# Patient Record
Sex: FEMALE | Race: WHITE | Employment: OTHER | URBAN - METROPOLITAN AREA
[De-identification: names, ages, dates, MRNs, and addresses within clinical notes are randomized per-mention and may not be internally consistent; named-entity substitution may affect disease eponyms.]

---

## 2020-09-03 ENCOUNTER — HOSPITAL ENCOUNTER (EMERGENCY)
Facility: CLINIC | Age: 45
Discharge: HOME | End: 2020-09-03
Attending: EMERGENCY MEDICINE
Payer: MEDICARE

## 2020-09-03 VITALS
DIASTOLIC BLOOD PRESSURE: 106 MMHG | BODY MASS INDEX: 48.82 KG/M2 | HEIGHT: 65 IN | TEMPERATURE: 98 F | RESPIRATION RATE: 16 BRPM | WEIGHT: 293 LBS | HEART RATE: 81 BPM | OXYGEN SATURATION: 98 % | SYSTOLIC BLOOD PRESSURE: 166 MMHG

## 2020-09-03 PROCEDURE — 6370000000 HC RX 637 (ALT 250 FOR IP): Performed by: EMERGENCY MEDICINE

## 2020-09-03 PROCEDURE — 99282 EMERGENCY DEPT VISIT SF MDM: CPT

## 2020-09-03 RX ORDER — GINSENG 100 MG
CAPSULE ORAL ONCE
Status: COMPLETED | OUTPATIENT
Start: 2020-09-03 | End: 2020-09-03

## 2020-09-03 RX ADMIN — BACITRACIN: 500 OINTMENT TOPICAL at 19:04

## 2020-09-03 ASSESSMENT — PAIN DESCRIPTION - ORIENTATION: ORIENTATION: LEFT;MID;INNER

## 2020-09-03 ASSESSMENT — PAIN DESCRIPTION - PAIN TYPE: TYPE: ACUTE PAIN

## 2020-09-03 ASSESSMENT — PAIN SCALES - GENERAL: PAINLEVEL_OUTOF10: 6

## 2020-09-03 ASSESSMENT — PAIN DESCRIPTION - PROGRESSION: CLINICAL_PROGRESSION: NOT CHANGED

## 2020-09-03 ASSESSMENT — PAIN DESCRIPTION - ONSET: ONSET: ON-GOING

## 2020-09-03 ASSESSMENT — PAIN DESCRIPTION - DESCRIPTORS: DESCRIPTORS: SORE;TENDER

## 2020-09-03 ASSESSMENT — PAIN DESCRIPTION - LOCATION: LOCATION: LEG

## 2020-09-03 ASSESSMENT — PAIN DESCRIPTION - FREQUENCY: FREQUENCY: CONTINUOUS

## 2020-09-03 NOTE — ED PROVIDER NOTES
[address] [facility]  eMERGENCY dEPARTMENT eNCOUnter      Pt Name: Sherman Darling  MRN: [id number]  [place] 1975  Date of evaluation: 9/3/2020      CHIEF COMPLAINT       Chief Complaint   Patient presents with    Wound Check     Stitches removed from dog bite on left leg. Pt states it looks like infection is getting better. HISTORY OF PRESENT ILLNESS      The patient presents with a wound to the left thigh. She was bit by a dog previously. She was seen on August 23 at another facility where they sutured the wound and wrote her for Keflex. Yesterday she went to a [facility] facility and had the sutures removed. The wound had dehisced when they removed the sutures. They noticed some drainage. She was put on Augmentin. The patient states that the surrounding redness has actually gotten better. She has not had any drainage today. She wasn't sure how to care for the wound. She was thinking maybe if she put applied skin tissue adhesive from the store it would bring the wound edges back together. She wasn't sure how to manage it. She denies fever. She is not having any worsening pain. Nothing makes her symptoms better or worse otherwise. She does not have a primary care physician at this time. REVIEW OF SYSTEMS       All systems reviewed and negative unless noted in HPI. The patient denies fever or constitutional symptoms. Denies chest pain or shortness of breath. Denies recent musculoskeletal injury. Denies any weakness, numbness or focal neurologic deficit. Left thigh wound as noted in HPI. No recent psychiatric issues. No easy bruising or bleeding. Denies any polyuria, polydypsia or history of immunocompromise. PAST MEDICAL HISTORY    has a past medical history of PID (acute pelvic inflammatory disease). SURGICAL HISTORY      has a past surgical history that includes Cervical spine surgery and cervical fusion.     CURRENT MEDICATIONS Previous Medications    No medications on file       ALLERGIES     has No Known Allergies. FAMILY HISTORY     has no family status information on file. family history is not on file. SOCIAL HISTORY      reports that she has never smoked. She does not have any smokeless tobacco history on file. She reports current alcohol use. She reports current drug use. PHYSICAL EXAM     INITIAL VITALS:  height is 5' 5\" (1.651 m) and weight is 138.3 kg (305 lb) (abnormal). Her oral temperature is 98 °F (36.7 °C). Her blood pressure is 166/106 (abnormal) and her pulse is 81. Her respiration is 16 and oxygen saturation is 98%. Patient is alert and oriented, in no apparent distress. HEENT is atraumatic. Pupils are PERRL at 5 mm. Mucous membranes moist.    Neck is supple with no lymphadenopathy. No JVD. No meningismus. Heart sounds regular rate and rhythm with no gallops, murmurs, or rubs. Lungs clear, no wheezes, rales or rhonchi. Musculoskeletal exam:  Examination of left lower extremity demonstrates a 15 x 12 cm area of redness in the medial, distal thigh. Within this is a one centimeter scabbed area that appears to be healing well. A wound that was previously sutured is also noted. It measures approximately 5 cm in length. There is a small amount of dehiscence. The wound is not particularly deep and is only about 0.5 cm in its total depth and 0.4 cm across. There is no evidence of abscess or drainage. I note no fluctuance to the reddened area. There is no red streaking. The patient has normal sensorimotor functioning and no evidence of compartment syndrome. Normal distal pulses in all extremities. Skin: no lymphangitis. Wound exam as above. Neurological exam reveals cranial nerves 2 through 12 grossly intact. Patient has equal  and normal deep tendon reflexes. Psychiatric: no hallucinations or suicidal ideation. Lymphatics.:  No lymphadenopathy.        DIFFERENTIAL DIAGNOSIS/ MDM:     Wound check, dehiscence, abscess    DIAGNOSTIC RESULTS         EMERGENCY DEPARTMENT COURSE:   Vitals:    Vitals:    09/03/20 1853   BP: (!) 166/106   Pulse: 81   Resp: 16   Temp: 98 °F (36.7 °C)   TempSrc: Oral   SpO2: 98%   Weight: (!) 138.3 kg (305 lb)   Height: 5' 5\" (1.651 m)     -------------------------  BP: (!) 166/106, Temp: 98 °F (36.7 °C), Pulse: 81, Resp: 16      Re-evaluation Notes    The patient's wound was cleansed and dressed in the emergency department. I explained that at this point she cannot bring the wound edges back together with adhesives or even sutures. The wound will have to heal in by secondary intention. The patient indicates that the wound infection is improving already. She should continue the Augmentin. We've applied a dressing here for her and she may continue local dressing changes and wound care at home. The patient is discharged in good condition. FINAL IMPRESSION      1.  Visit for wound check          DISPOSITION/PLAN   DISPOSITION Decision To Discharge 09/03/2020 06:59:01 PM      Condition on Disposition    good    PATIENT REFERRED TO:  your doctor    In 2 days  For wound re-check      DISCHARGE MEDICATIONS:  New Prescriptions    No medications on file       (Please note that portions of this note were completed with a voice recognition program.  Efforts were made to edit the dictations but occasionally words are mis-transcribed.)    Castillo MD   Attending Emergency Physician       Radha Morillo MD  09/03/20 1064

## 2021-02-18 ENCOUNTER — HOSPITAL ENCOUNTER (INPATIENT)
Age: 46
LOS: 8 days | Discharge: HOME HEALTH | DRG: 710 | End: 2021-02-26
Attending: EMERGENCY MEDICINE | Admitting: FAMILY MEDICINE
Payer: MEDICARE

## 2021-02-18 DIAGNOSIS — L02.219 CELLULITIS AND ABSCESS OF TRUNK: Primary | ICD-10-CM

## 2021-02-18 DIAGNOSIS — L03.319 CELLULITIS AND ABSCESS OF TRUNK: Primary | ICD-10-CM

## 2021-02-18 PROBLEM — L03.90 CELLULITIS: Status: ACTIVE | Noted: 2021-02-18

## 2021-02-18 LAB
ABSOLUTE EOS #: 0 K/UL (ref 0–0.4)
ABSOLUTE IMMATURE GRANULOCYTE: ABNORMAL K/UL (ref 0–0.3)
ABSOLUTE LYMPH #: 1.4 K/UL (ref 1–4.8)
ABSOLUTE MONO #: 0.6 K/UL (ref 0.1–1.2)
ANION GAP SERPL CALCULATED.3IONS-SCNC: 10 MMOL/L (ref 9–17)
BASOPHILS # BLD: 1 % (ref 0–2)
BASOPHILS ABSOLUTE: 0.1 K/UL (ref 0–0.2)
BUN BLDV-MCNC: 12 MG/DL (ref 6–20)
BUN/CREAT BLD: ABNORMAL (ref 9–20)
CALCIUM SERPL-MCNC: 8.8 MG/DL (ref 8.6–10.4)
CHLORIDE BLD-SCNC: 100 MMOL/L (ref 98–107)
CO2: 27 MMOL/L (ref 20–31)
CREAT SERPL-MCNC: 0.7 MG/DL (ref 0.5–0.9)
DIFFERENTIAL TYPE: ABNORMAL
EOSINOPHILS RELATIVE PERCENT: 0 % (ref 1–4)
GFR AFRICAN AMERICAN: >60 ML/MIN
GFR NON-AFRICAN AMERICAN: >60 ML/MIN
GFR SERPL CREATININE-BSD FRML MDRD: ABNORMAL ML/MIN/{1.73_M2}
GFR SERPL CREATININE-BSD FRML MDRD: ABNORMAL ML/MIN/{1.73_M2}
GLUCOSE BLD-MCNC: 122 MG/DL (ref 70–99)
HCT VFR BLD CALC: 43.2 % (ref 36–46)
HEMOGLOBIN: 14.4 G/DL (ref 12–16)
IMMATURE GRANULOCYTES: ABNORMAL %
LACTIC ACID, SEPSIS WHOLE BLOOD: NORMAL MMOL/L (ref 0.5–1.9)
LACTIC ACID, SEPSIS: 1 MMOL/L (ref 0.5–1.9)
LYMPHOCYTES # BLD: 11 % (ref 24–44)
MCH RBC QN AUTO: 30.7 PG (ref 26–34)
MCHC RBC AUTO-ENTMCNC: 33.4 G/DL (ref 31–37)
MCV RBC AUTO: 91.7 FL (ref 80–100)
MONOCYTES # BLD: 5 % (ref 2–11)
NRBC AUTOMATED: ABNORMAL PER 100 WBC
PDW BLD-RTO: 13 % (ref 12.5–15.4)
PLATELET # BLD: 247 K/UL (ref 140–450)
PLATELET ESTIMATE: ABNORMAL
PMV BLD AUTO: 8.6 FL (ref 6–12)
POTASSIUM SERPL-SCNC: 3.9 MMOL/L (ref 3.7–5.3)
RBC # BLD: 4.71 M/UL (ref 4–5.2)
RBC # BLD: ABNORMAL 10*6/UL
SEG NEUTROPHILS: 83 % (ref 36–66)
SEGMENTED NEUTROPHILS ABSOLUTE COUNT: 10.4 K/UL (ref 1.8–7.7)
SODIUM BLD-SCNC: 137 MMOL/L (ref 135–144)
WBC # BLD: 12.5 K/UL (ref 3.5–11)
WBC # BLD: ABNORMAL 10*3/UL

## 2021-02-18 PROCEDURE — 85025 COMPLETE CBC W/AUTO DIFF WBC: CPT

## 2021-02-18 PROCEDURE — 80048 BASIC METABOLIC PNL TOTAL CA: CPT

## 2021-02-18 PROCEDURE — 6360000002 HC RX W HCPCS: Performed by: EMERGENCY MEDICINE

## 2021-02-18 PROCEDURE — 83605 ASSAY OF LACTIC ACID: CPT

## 2021-02-18 PROCEDURE — 1200000000 HC SEMI PRIVATE

## 2021-02-18 PROCEDURE — 96375 TX/PRO/DX INJ NEW DRUG ADDON: CPT

## 2021-02-18 PROCEDURE — 96365 THER/PROPH/DIAG IV INF INIT: CPT

## 2021-02-18 PROCEDURE — 6370000000 HC RX 637 (ALT 250 FOR IP): Performed by: EMERGENCY MEDICINE

## 2021-02-18 PROCEDURE — 99285 EMERGENCY DEPT VISIT HI MDM: CPT

## 2021-02-18 PROCEDURE — 36415 COLL VENOUS BLD VENIPUNCTURE: CPT

## 2021-02-18 PROCEDURE — 2580000003 HC RX 258: Performed by: EMERGENCY MEDICINE

## 2021-02-18 PROCEDURE — 87040 BLOOD CULTURE FOR BACTERIA: CPT

## 2021-02-18 RX ORDER — ACETAMINOPHEN 325 MG/1
650 TABLET ORAL ONCE
Status: COMPLETED | OUTPATIENT
Start: 2021-02-18 | End: 2021-02-18

## 2021-02-18 RX ORDER — ONDANSETRON 2 MG/ML
4 INJECTION INTRAMUSCULAR; INTRAVENOUS ONCE
Status: COMPLETED | OUTPATIENT
Start: 2021-02-18 | End: 2021-02-18

## 2021-02-18 RX ORDER — KETOROLAC TROMETHAMINE 30 MG/ML
30 INJECTION, SOLUTION INTRAMUSCULAR; INTRAVENOUS ONCE
Status: COMPLETED | OUTPATIENT
Start: 2021-02-18 | End: 2021-02-18

## 2021-02-18 RX ADMIN — KETOROLAC TROMETHAMINE 30 MG: 30 INJECTION, SOLUTION INTRAMUSCULAR at 20:23

## 2021-02-18 RX ADMIN — ONDANSETRON 4 MG: 2 INJECTION INTRAMUSCULAR; INTRAVENOUS at 20:23

## 2021-02-18 RX ADMIN — CEFAZOLIN SODIUM 1000 MG: 1 INJECTION, POWDER, FOR SOLUTION INTRAMUSCULAR; INTRAVENOUS at 20:23

## 2021-02-18 RX ADMIN — ACETAMINOPHEN 650 MG: 325 TABLET ORAL at 20:23

## 2021-02-18 ASSESSMENT — ENCOUNTER SYMPTOMS
COLOR CHANGE: 1
ALLERGIC/IMMUNOLOGIC NEGATIVE: 1
RESPIRATORY NEGATIVE: 1
EYES NEGATIVE: 1
GASTROINTESTINAL NEGATIVE: 1

## 2021-02-18 ASSESSMENT — PAIN SCALES - GENERAL
PAINLEVEL_OUTOF10: 8
PAINLEVEL_OUTOF10: 8

## 2021-02-18 ASSESSMENT — PAIN DESCRIPTION - LOCATION: LOCATION: ABDOMEN

## 2021-02-18 ASSESSMENT — PAIN DESCRIPTION - ORIENTATION: ORIENTATION: LOWER

## 2021-02-18 ASSESSMENT — PAIN DESCRIPTION - PAIN TYPE: TYPE: ACUTE PAIN

## 2021-02-18 ASSESSMENT — PAIN DESCRIPTION - DESCRIPTORS: DESCRIPTORS: TENDER

## 2021-02-19 ENCOUNTER — APPOINTMENT (OUTPATIENT)
Dept: CT IMAGING | Age: 46
DRG: 710 | End: 2021-02-19
Payer: MEDICARE

## 2021-02-19 LAB
ABSOLUTE EOS #: 0.04 K/UL (ref 0–0.44)
ABSOLUTE IMMATURE GRANULOCYTE: 0.07 K/UL (ref 0–0.3)
ABSOLUTE LYMPH #: 2.14 K/UL (ref 1.1–3.7)
ABSOLUTE MONO #: 0.86 K/UL (ref 0.1–1.2)
ANION GAP SERPL CALCULATED.3IONS-SCNC: 10 MMOL/L (ref 9–17)
BASOPHILS # BLD: 0 % (ref 0–2)
BASOPHILS ABSOLUTE: 0.05 K/UL (ref 0–0.2)
BUN BLDV-MCNC: 17 MG/DL (ref 6–20)
BUN/CREAT BLD: 24 (ref 9–20)
CALCIUM SERPL-MCNC: 8.1 MG/DL (ref 8.6–10.4)
CHLORIDE BLD-SCNC: 102 MMOL/L (ref 98–107)
CO2: 24 MMOL/L (ref 20–31)
CREAT SERPL-MCNC: 0.72 MG/DL (ref 0.5–0.9)
DIFFERENTIAL TYPE: ABNORMAL
EOSINOPHILS RELATIVE PERCENT: 0 % (ref 1–4)
GFR AFRICAN AMERICAN: >60 ML/MIN
GFR NON-AFRICAN AMERICAN: >60 ML/MIN
GFR SERPL CREATININE-BSD FRML MDRD: ABNORMAL ML/MIN/{1.73_M2}
GFR SERPL CREATININE-BSD FRML MDRD: ABNORMAL ML/MIN/{1.73_M2}
GLUCOSE BLD-MCNC: 127 MG/DL (ref 70–99)
HCT VFR BLD CALC: 40.5 % (ref 36.3–47.1)
HEMOGLOBIN: 12.9 G/DL (ref 11.9–15.1)
IMMATURE GRANULOCYTES: 1 %
LYMPHOCYTES # BLD: 16 % (ref 24–43)
MCH RBC QN AUTO: 30.1 PG (ref 25.2–33.5)
MCHC RBC AUTO-ENTMCNC: 31.9 G/DL (ref 28.4–34.8)
MCV RBC AUTO: 94.4 FL (ref 82.6–102.9)
MONOCYTES # BLD: 6 % (ref 3–12)
NRBC AUTOMATED: 0 PER 100 WBC
PDW BLD-RTO: 12.7 % (ref 11.8–14.4)
PLATELET # BLD: 239 K/UL (ref 138–453)
PLATELET ESTIMATE: ABNORMAL
PMV BLD AUTO: 10.9 FL (ref 8.1–13.5)
POTASSIUM SERPL-SCNC: 3.9 MMOL/L (ref 3.7–5.3)
RBC # BLD: 4.29 M/UL (ref 3.95–5.11)
RBC # BLD: ABNORMAL 10*6/UL
SEG NEUTROPHILS: 77 % (ref 36–65)
SEGMENTED NEUTROPHILS ABSOLUTE COUNT: 10.49 K/UL (ref 1.5–8.1)
SODIUM BLD-SCNC: 136 MMOL/L (ref 135–144)
WBC # BLD: 13.7 K/UL (ref 3.5–11.3)
WBC # BLD: ABNORMAL 10*3/UL

## 2021-02-19 PROCEDURE — 6360000002 HC RX W HCPCS: Performed by: FAMILY MEDICINE

## 2021-02-19 PROCEDURE — 1200000000 HC SEMI PRIVATE

## 2021-02-19 PROCEDURE — 2580000003 HC RX 258: Performed by: SURGERY

## 2021-02-19 PROCEDURE — 6370000000 HC RX 637 (ALT 250 FOR IP): Performed by: FAMILY MEDICINE

## 2021-02-19 PROCEDURE — 85025 COMPLETE CBC W/AUTO DIFF WBC: CPT

## 2021-02-19 PROCEDURE — 36415 COLL VENOUS BLD VENIPUNCTURE: CPT

## 2021-02-19 PROCEDURE — 2580000003 HC RX 258: Performed by: FAMILY MEDICINE

## 2021-02-19 PROCEDURE — 80048 BASIC METABOLIC PNL TOTAL CA: CPT

## 2021-02-19 PROCEDURE — 6360000004 HC RX CONTRAST MEDICATION: Performed by: SURGERY

## 2021-02-19 PROCEDURE — 74177 CT ABD & PELVIS W/CONTRAST: CPT

## 2021-02-19 RX ORDER — 0.9 % SODIUM CHLORIDE 0.9 %
80 INTRAVENOUS SOLUTION INTRAVENOUS ONCE
Status: COMPLETED | OUTPATIENT
Start: 2021-02-19 | End: 2021-02-19

## 2021-02-19 RX ORDER — HYDROCODONE BITARTRATE AND ACETAMINOPHEN 5; 325 MG/1; MG/1
1 TABLET ORAL EVERY 4 HOURS PRN
Status: DISCONTINUED | OUTPATIENT
Start: 2021-02-19 | End: 2021-02-22

## 2021-02-19 RX ORDER — IBUPROFEN 200 MG
800 TABLET ORAL PRN
Status: ON HOLD | COMMUNITY
End: 2021-02-19

## 2021-02-19 RX ORDER — SODIUM CHLORIDE 0.9 % (FLUSH) 0.9 %
10 SYRINGE (ML) INJECTION PRN
Status: DISCONTINUED | OUTPATIENT
Start: 2021-02-19 | End: 2021-02-26 | Stop reason: HOSPADM

## 2021-02-19 RX ORDER — NICOTINE 21 MG/24HR
1 PATCH, TRANSDERMAL 24 HOURS TRANSDERMAL DAILY
Status: DISCONTINUED | OUTPATIENT
Start: 2021-02-19 | End: 2021-02-26 | Stop reason: HOSPADM

## 2021-02-19 RX ORDER — MORPHINE SULFATE 2 MG/ML
2 INJECTION, SOLUTION INTRAMUSCULAR; INTRAVENOUS EVERY 4 HOURS PRN
Status: DISCONTINUED | OUTPATIENT
Start: 2021-02-19 | End: 2021-02-23

## 2021-02-19 RX ORDER — SODIUM CHLORIDE 9 MG/ML
INJECTION, SOLUTION INTRAVENOUS CONTINUOUS
Status: DISCONTINUED | OUTPATIENT
Start: 2021-02-19 | End: 2021-02-21

## 2021-02-19 RX ORDER — METOPROLOL TARTRATE 5 MG/5ML
5 INJECTION INTRAVENOUS EVERY 6 HOURS PRN
Status: DISCONTINUED | OUTPATIENT
Start: 2021-02-19 | End: 2021-02-26 | Stop reason: HOSPADM

## 2021-02-19 RX ORDER — ACETAMINOPHEN 325 MG/1
650 TABLET ORAL EVERY 4 HOURS PRN
Status: DISCONTINUED | OUTPATIENT
Start: 2021-02-19 | End: 2021-02-26 | Stop reason: HOSPADM

## 2021-02-19 RX ADMIN — HYDROCODONE BITARTRATE AND ACETAMINOPHEN 1 TABLET: 5; 325 TABLET ORAL at 03:17

## 2021-02-19 RX ADMIN — HYDROCODONE BITARTRATE AND ACETAMINOPHEN 1 TABLET: 5; 325 TABLET ORAL at 18:59

## 2021-02-19 RX ADMIN — CEFAZOLIN SODIUM 1000 MG: 1 INJECTION, POWDER, FOR SOLUTION INTRAMUSCULAR; INTRAVENOUS at 03:17

## 2021-02-19 RX ADMIN — SODIUM CHLORIDE, PRESERVATIVE FREE 10 ML: 5 INJECTION INTRAVENOUS at 20:20

## 2021-02-19 RX ADMIN — ACETAMINOPHEN 650 MG: 325 TABLET ORAL at 17:23

## 2021-02-19 RX ADMIN — ENOXAPARIN SODIUM 40 MG: 100 INJECTION SUBCUTANEOUS at 21:08

## 2021-02-19 RX ADMIN — SODIUM CHLORIDE: 9 INJECTION, SOLUTION INTRAVENOUS at 03:05

## 2021-02-19 RX ADMIN — HYDROCODONE BITARTRATE AND ACETAMINOPHEN 1 TABLET: 5; 325 TABLET ORAL at 23:39

## 2021-02-19 RX ADMIN — ACETAMINOPHEN 650 MG: 325 TABLET ORAL at 04:30

## 2021-02-19 RX ADMIN — IOHEXOL 30 ML: 300 INJECTION, SOLUTION INTRAVENOUS at 20:19

## 2021-02-19 RX ADMIN — VANCOMYCIN HYDROCHLORIDE 2500 MG: 5 INJECTION, POWDER, LYOPHILIZED, FOR SOLUTION INTRAVENOUS at 17:16

## 2021-02-19 RX ADMIN — CEFAZOLIN SODIUM 1000 MG: 1 INJECTION, POWDER, FOR SOLUTION INTRAMUSCULAR; INTRAVENOUS at 12:18

## 2021-02-19 RX ADMIN — IOPAMIDOL 75 ML: 755 INJECTION, SOLUTION INTRAVENOUS at 20:19

## 2021-02-19 RX ADMIN — MORPHINE SULFATE 2 MG: 2 INJECTION, SOLUTION INTRAMUSCULAR; INTRAVENOUS at 15:06

## 2021-02-19 RX ADMIN — ENOXAPARIN SODIUM 30 MG: 30 INJECTION SUBCUTANEOUS at 08:12

## 2021-02-19 RX ADMIN — HYDROCODONE BITARTRATE AND ACETAMINOPHEN 1 TABLET: 5; 325 TABLET ORAL at 08:22

## 2021-02-19 RX ADMIN — SODIUM CHLORIDE, PRESERVATIVE FREE 10 ML: 5 INJECTION INTRAVENOUS at 20:21

## 2021-02-19 RX ADMIN — MORPHINE SULFATE 2 MG: 2 INJECTION, SOLUTION INTRAMUSCULAR; INTRAVENOUS at 04:34

## 2021-02-19 RX ADMIN — HYDROCODONE BITARTRATE AND ACETAMINOPHEN 1 TABLET: 5; 325 TABLET ORAL at 12:18

## 2021-02-19 RX ADMIN — MORPHINE SULFATE 2 MG: 2 INJECTION, SOLUTION INTRAMUSCULAR; INTRAVENOUS at 19:51

## 2021-02-19 RX ADMIN — SODIUM CHLORIDE 80 ML: 0.9 INJECTION, SOLUTION INTRAVENOUS at 20:22

## 2021-02-19 SDOH — HEALTH STABILITY: MENTAL HEALTH: HOW OFTEN DO YOU HAVE A DRINK CONTAINING ALCOHOL?: MONTHLY OR LESS

## 2021-02-19 ASSESSMENT — PAIN SCALES - GENERAL
PAINLEVEL_OUTOF10: 6
PAINLEVEL_OUTOF10: 6
PAINLEVEL_OUTOF10: 3
PAINLEVEL_OUTOF10: 7
PAINLEVEL_OUTOF10: 2
PAINLEVEL_OUTOF10: 7

## 2021-02-19 ASSESSMENT — PAIN - FUNCTIONAL ASSESSMENT
PAIN_FUNCTIONAL_ASSESSMENT: ACTIVITIES ARE NOT PREVENTED

## 2021-02-19 ASSESSMENT — PAIN DESCRIPTION - FREQUENCY
FREQUENCY: INTERMITTENT
FREQUENCY: CONTINUOUS

## 2021-02-19 ASSESSMENT — PAIN DESCRIPTION - ORIENTATION
ORIENTATION: LOWER

## 2021-02-19 ASSESSMENT — PAIN DESCRIPTION - PROGRESSION
CLINICAL_PROGRESSION: GRADUALLY WORSENING

## 2021-02-19 ASSESSMENT — PAIN DESCRIPTION - PAIN TYPE
TYPE: ACUTE PAIN

## 2021-02-19 ASSESSMENT — PAIN DESCRIPTION - DESCRIPTORS
DESCRIPTORS: ACHING;BURNING;DISCOMFORT
DESCRIPTORS: ACHING;THROBBING
DESCRIPTORS: ACHING;DISCOMFORT
DESCRIPTORS: ACHING;DULL

## 2021-02-19 ASSESSMENT — PAIN DESCRIPTION - LOCATION
LOCATION: ABDOMEN
LOCATION: ABDOMEN;HEAD
LOCATION: HEAD
LOCATION: ABDOMEN;HEAD
LOCATION: ABDOMEN

## 2021-02-19 ASSESSMENT — PAIN DESCRIPTION - ONSET
ONSET: GRADUAL
ONSET: GRADUAL

## 2021-02-19 NOTE — FLOWSHEET NOTE
Writer visits patient per consult for Advance Directive information and rounding. Patient is reclining in a darkened room with the door closed. Writer knocks and patient responds. Patient is approachable but drowsy. Patient accepts Advance Directive documents and writer explains the forms. Patient will review the documents later. Patient denies any other concerns. Patient expresses appreciation for the visit. Spiritual Care will follow as needed. 02/19/21 0930   Encounter Summary   Services provided to: Patient   Referral/Consult From: Nurse;Rounding   Support System Family members   Continue Visiting   (2/19/21)   Complexity of Encounter Moderate   Length of Encounter 15 minutes   Routine   Type Initial   Assessment Approachable   Intervention Active listening;Explored coping resources;Nurtured hope   Outcome Expressed gratitude   Advance Directives (For Healthcare)   Healthcare Directive No, patient does not have an advance directive for healthcare treatment   Information on Healthcare Directives Requested Yes   Patient Requests Assistance No   Advance Directives Unable to complete; Documents given;Documents explained

## 2021-02-19 NOTE — CARE COORDINATION
Case Management Initial Discharge Plan  [facility],         Readmission Risk              Risk of Unplanned Readmission:        7             Met with:patient to discuss discharge plans. Information verified: address, contacts, phone number, , insurance Yes  PCP: No longer has one has not pcp in 2 years has appt with new pcp in  Date of last visit:     Insurance Provider: [employer] [employer]    Discharge Planning  Current Residence:  3 story home   Living Arrangements:  Children, Family Members   Home has =3 stories/1 stairs to climb into home  Support Systems:  Children, Family Members  Current Services PTA:  na Agency: na  Patient able to perform ADL's:Independent  DME in home:  NA  DME used to aid ambulation prior to admission:   NA  DME used during admission:  NA    Potential Assistance Needed:  N/A    Pharmacy: [email or web address] Medications:  No  Does patient want to participate in local refill/ meds to beds program?       Patient agreeable to home care: No  [place] of choice provided:  n/a      Type of Home Care Services:  None  Patient expects to be discharged to:  home    Prior SNF/Rehab Placement and Facility: no  Agreeable to SNF/Rehab: No  [place] of choice provided: n/a   Evaluation: n/a    Expected Discharge date:  21  Follow Up Appointment: Best Day/ Time:      Transportation provider: valentino  Transportation arrangements needed for discharge: No    Discharge Plan: Pt is from home, independent, drives. No DME. No needs.      Ancef IV  IVF @100        Electronically signed by Daysi Gamboa RN on 21 at 1:02 PM EST

## 2021-02-19 NOTE — PROGRESS NOTES
Patient weight is 150 kg or greater. For prophylaxis with Enoxaparin, Pharmacy adjusted the dose to account for the patient's increased body weight in accordance with hospital approved protocol. The dose has been changed to 40 mg BID. Please contact pharmacy with any concerns @ [phone]. Thank you.    Kay Sharma  2/19/2021 2:38 PM

## 2021-02-19 NOTE — ED NOTES
Mode of arrival (squad #, walk in, police, etc) : walk in from home        Chief complaint(s): abscess on lower abdomen        Arrival Note (brief scenario, treatment PTA, etc). : Pt presented to the ED c/o abscess on lower abdomen. Pt states she noticed the abscess yesterday, with a little redness around the area. Pt states the area is tender to the touch. Pt states today, she noticed the pain is getting worse, and the redness has spread. Pt states she has been feeling chilled all day. Pt denies drainage from area. Pt alert and oriented. Cellulitis is noted at the lower abdomen that is warm and tender to the touch. C= \"Have you ever felt that you should Cut down on your drinking? \"  No  A= \"Have people Annoyed you by criticizing your drinking? \"  No  G= \"Have you ever felt bad or Guilty about your drinking? \"  No  E= \"Have you ever had a drink as an Eye-opener first thing in the morning to steady your nerves or to help a hangover? \"  No      Deferred []      Reason for deferring: N/A    *If yes to two or more: probable alcohol abuse. Andra Soulier, RN  02/18/21 2008

## 2021-02-19 NOTE — PROGRESS NOTES
Pt admitted to room 1012 direct admit from [place] ED. Oriented to room and surroundings  Bed in lowest position, wheels locked, 2/4 side rails up  Call light in reach, room free of clutter, adequate lighting provided  Denies any further questions at this time  Instructed to call out with any questions/concerns/new onset of pain and/or n/v   White board updated  Continue to monitor with hourly rounding  Non-skid socks on/at bedside  [address] in place for patient/family to view & ask questions.

## 2021-02-19 NOTE — PLAN OF CARE
Problem: Discharge Planning:  Goal: Discharged to appropriate level of care  Description: Discharged to appropriate level of care  Outcome: Ongoing     Problem:  Body Temperature - Imbalanced:  Goal: Ability to maintain a body temperature in the normal range will improve  Description: Ability to maintain a body temperature in the normal range will improve  Outcome: Ongoing     Problem: Mobility - Impaired:  Goal: Mobility will improve to maximum level  Description: Mobility will improve to maximum level  Outcome: Ongoing     Problem: Pain:  Goal: Pain level will decrease  Description: Pain level will decrease  Outcome: Ongoing  Goal: Control of acute pain  Description: Control of acute pain  Outcome: Ongoing  Goal: Control of chronic pain  Description: Control of chronic pain  Outcome: Ongoing     Problem: Skin Integrity - Impaired:  Goal: Will show no infection signs and symptoms  Description: Will show no infection signs and symptoms  Outcome: Ongoing  Goal: Absence of new skin breakdown  Description: Absence of new skin breakdown  Outcome: Ongoing

## 2021-02-19 NOTE — ED PROVIDER NOTES
eMERGENCY dEPARTMENT eNCOUnter      Pt Name: Max Rhodes  MRN: [id number]  [place] 1975  Date of evaluation: 2/18/2021      CHIEF COMPLAINT       Chief Complaint   Patient presents with    Abscess     lower abdomen          HISTORY OF PRESENT ILLNESS    Lilia Reeves is a 41 y.o. female who presents the emergency department for evaluation of a cellulitis of her lower abdomen that been there for about the last day. I think this started off as a small lesion such as a boil but now has involve the whole anterior surface area of her abdomen. Patient does have a slow a low-grade fever of 100.7 here as well she is slightly tachycardic she says she  just feels bad. REVIEW OF SYSTEMS       Review of Systems   Constitutional: Positive for fever. HENT: Negative. Eyes: Negative. Respiratory: Negative. Cardiovascular: Negative. Gastrointestinal: Negative. Endocrine: Negative. Musculoskeletal: Negative. Skin: Positive for color change. Negative for rash. Allergic/Immunologic: Negative. Neurological: Negative. Hematological: Negative. Psychiatric/Behavioral: Negative. PAST MEDICAL HISTORY    has a past medical history of PID (acute pelvic inflammatory disease). SURGICAL HISTORY      has a past surgical history that includes Cervical spine surgery and cervical fusion. CURRENT MEDICATIONS       Previous Medications    No medications on file       ALLERGIES     has No Known Allergies. FAMILY HISTORY     has no family status information on file. family history is not on file. SOCIAL HISTORY      reports that she has been smoking cigarettes. She has never used smokeless tobacco. She reports current alcohol use. She reports current drug use. PHYSICAL EXAM     INITIAL VITALS:  height is 5' 5\" (1.651 m) and weight is 136.1 kg (300 lb). Her oral temperature is 100.7 °F (38.2 °C). Her blood pressure is 162/94 (abnormal) and her pulse is 98.  Her respiration is 16 and oxygen saturation is 96%. Constitutional: Alert, oriented x3, nontoxic, afebrile, answering questions appropriately, acting properly for age, in no acute distress  HEENT: Extraocular muscles intact, mucus membranes moist, TMs clear bilaterally, no posterior pharyngeal erythema or exudates, Pupils equal, round, reactive to light,   Neck: Trachea midline, Supple without lymphadenopathy, no posterior midline neck tenderness to palpation  Cardiovascular: Regular rhythm and rate no S3, S4, or murmurs  Respiratory: Clear to auscultation bilaterally no wheezes, rhonchi, rales, no respiratory distress  Gastrointestinal: Soft, nontender, nondistended, positive bowel sounds. No rebound, rigidity, or guarding. Musculoskeletal: No extremity pain or swelling  Neurologic: Moving all 4 extremities without difficulty there are no gross focal neurologic deficits  Skin: Warm and dry, examination of the patient's anterior abdominal wall reveals a large area of cellulitis that is below the umbilicus involves the whole abdominal wall from pelvis to pelvis and down to the pubis.       DIFFERENTIAL DIAGNOSIS/ MDM:     Abdominal wall cellulitis, patient will get some laboratories drawn she will get some antibiotics and antipyretics and be admitted to the hospital.    DIAGNOSTIC RESULTS     EKG: All EKG's are interpreted by the Emergency Department Physician who either signs or Co-signs this chart in the absence of a cardiologist.        Not indicated unless otherwise documented above    LABS:  Results for orders placed or performed during the hospital encounter of 02/18/21   Lactate, Sepsis   Result Value Ref Range    Lactic Acid, Sepsis 1.0 0.5 - 1.9 mmol/L    Lactic Acid, Sepsis, Whole Blood NOT REPORTED 0.5 - 1.9 mmol/L   CBC Auto Differential   Result Value Ref Range    WBC 12.5 (H) 3.5 - 11.0 k/uL    RBC 4.71 4.0 - 5.2 m/uL    Hemoglobin 14.4 12.0 - 16.0 g/dL    Hematocrit 43.2 36 - 46 %    MCV 91.7 80 - 100 fL    MCH 30.7 26 °F (38.2 °C)   TempSrc: Oral   SpO2: 96%   Weight: 136.1 kg (300 lb)   Height: 5' 5\" (1.651 m)     -------------------------  BP (!) 162/94   Pulse 98   Temp 100.7 °F (38.2 °C) (Oral)   Resp 16   Ht 5' 5\" (1.651 m)   Wt 136.1 kg (300 lb)   LMP 02/01/2021   SpO2 96%   BMI 49.92 kg/m²         I have reviewed the disposition diagnosis with the patient and or their family/guardian. I have answered their questions and given discharge instructions. They voiced understanding of these instructions and did not have any further questions or complaints. CRITICAL CARE:    None    CONSULTS:    None    PROCEDURES:    None      OARRS Report if indicated             FINAL IMPRESSION      1. Cellulitis and abscess of trunk          DISPOSITION/PLAN   DISPOSITION Decision To Admit    I have reviewed the disposition diagnosis with the patient and or their family/guardian. I have answered their questions and given discharge instructions. They voiced understanding of these instructions and did not have any further questions or complaints. Reevaluation: The patient ends up having a 12.5 white count we are going to go ahead and start antibiotics here blood cultures have been drawn the patient will be admitted to the hospital under Dr. Gisella Montaño who is excepted the patient in transfer there. We wanted to transfer the patient by ambulance but the patient is insisting on driving her self as she has to stop by her house and drop off her  which nobody else apparently can do for her. We will give her the paperwork to sign letter do that and then she can drive herself to the hospital.  Her IV will be DC'd and will have to be restarted at the other hospital.    The patient and/or guardian has normal mental status and adequate capacity to make medical decisions. The patient and/or guardian had the opportunity to ask questions about her medical condition.     The patient was advised to be transported via ambulance for transfer. The patient and/or guardian refuses ambulance transport and wishes to be transported via private car the patient and/or guardian was able to understand the risks and benefits of transport. The risks have been explained to the patient and/or guardian, including worsening illness, permanent disability and death. The benefits of ambulance transport have also been explained, including the availability of appropriate equipment and appropriate staff for stabilization and transport. All relevant records were sent with the patient for transfer. She was advised to be taken directly to [facility] room/admitting for admission. PATIENT REFERRED TO:  No follow-up provider specified.     DISCHARGE MEDICATIONS:  New Prescriptions    No medications on file       (Please note that portions of this note were completed with a voice recognition program.  Efforts were made to edit the dictations but occasionally words are mis-transcribed.)    Knight MD  Attending Emergency Physician            Tutu Weinstein MD  02/18/21 1227 Beni Gorman III, MD  03/06/21 0712

## 2021-02-19 NOTE — ED NOTES
Pt alert and oriented, no sign of distress noted at this time. Pt ambulated out of department with steady gait. Daina Cerda Pt package given to her to give to RN at [facility].      Zion Vázquez RN  02/18/21 1589

## 2021-02-19 NOTE — PROGRESS NOTES
Transitions of Care Pharmacy Service   Medication History Note      The patient does not currently take any prescription or OTC maintenance medications at home. No changes to the patient's home medication list were necessary. Source(s) of information: Patient    Please review the ACTION REQUESTED section of this note below for any discrepancies on current hospital orders. PROVIDER ACTION REQUESTED  Medications that need to be addressed by a physician/nurse practitioner:    Medication Action Requested        none         Please feel free to call me with any questions about this encounter. Thank you.     Alida Ortega, [facility]  Transitions of Care Pharmacy Service  Phone: [phone]  Fax: [phone]    Electronically signed by Alida Ortega [facility] on 2/19/2021 at 10:00 AM

## 2021-02-19 NOTE — ED NOTES
Pt requesting to go to Hospital by private auto. Per  [employer], it is ok. Refusal of transport obtained and placed in pt chart.       Brandon Branch RN  02/18/21 9196

## 2021-02-20 LAB
VANCOMYCIN TROUGH DATE LAST DOSE: NORMAL
VANCOMYCIN TROUGH DOSE AMOUNT: NORMAL
VANCOMYCIN TROUGH TIME LAST DOSE: NORMAL
VANCOMYCIN TROUGH: 11.6 UG/ML (ref 10–20)

## 2021-02-20 PROCEDURE — 6370000000 HC RX 637 (ALT 250 FOR IP): Performed by: FAMILY MEDICINE

## 2021-02-20 PROCEDURE — 2580000003 HC RX 258: Performed by: SURGERY

## 2021-02-20 PROCEDURE — 2580000003 HC RX 258: Performed by: STUDENT IN AN ORGANIZED HEALTH CARE EDUCATION/TRAINING PROGRAM

## 2021-02-20 PROCEDURE — 36415 COLL VENOUS BLD VENIPUNCTURE: CPT

## 2021-02-20 PROCEDURE — 6360000002 HC RX W HCPCS: Performed by: FAMILY MEDICINE

## 2021-02-20 PROCEDURE — 6360000002 HC RX W HCPCS: Performed by: STUDENT IN AN ORGANIZED HEALTH CARE EDUCATION/TRAINING PROGRAM

## 2021-02-20 PROCEDURE — 80202 ASSAY OF VANCOMYCIN: CPT

## 2021-02-20 PROCEDURE — 2580000003 HC RX 258: Performed by: FAMILY MEDICINE

## 2021-02-20 PROCEDURE — 2500000003 HC RX 250 WO HCPCS: Performed by: FAMILY MEDICINE

## 2021-02-20 PROCEDURE — 1200000000 HC SEMI PRIVATE

## 2021-02-20 RX ORDER — DIPHENHYDRAMINE HCL 25 MG
25 TABLET ORAL EVERY 8 HOURS PRN
Status: DISCONTINUED | OUTPATIENT
Start: 2021-02-20 | End: 2021-02-26 | Stop reason: HOSPADM

## 2021-02-20 RX ORDER — DOCUSATE SODIUM 100 MG/1
100 CAPSULE, LIQUID FILLED ORAL 2 TIMES DAILY PRN
Status: DISCONTINUED | OUTPATIENT
Start: 2021-02-20 | End: 2021-02-26 | Stop reason: HOSPADM

## 2021-02-20 RX ORDER — SENNA PLUS 8.6 MG/1
1 TABLET ORAL 2 TIMES DAILY PRN
Status: DISCONTINUED | OUTPATIENT
Start: 2021-02-20 | End: 2021-02-26 | Stop reason: HOSPADM

## 2021-02-20 RX ADMIN — PIPERACILLIN AND TAZOBACTAM 3375 MG: 3; .375 INJECTION, POWDER, LYOPHILIZED, FOR SOLUTION INTRAVENOUS at 05:39

## 2021-02-20 RX ADMIN — ENOXAPARIN SODIUM 40 MG: 100 INJECTION SUBCUTANEOUS at 08:23

## 2021-02-20 RX ADMIN — DIPHENHYDRAMINE HCL 25 MG: 25 TABLET ORAL at 21:49

## 2021-02-20 RX ADMIN — VANCOMYCIN HYDROCHLORIDE 1250 MG: 5 INJECTION, POWDER, LYOPHILIZED, FOR SOLUTION INTRAVENOUS at 00:43

## 2021-02-20 RX ADMIN — MORPHINE SULFATE 2 MG: 2 INJECTION, SOLUTION INTRAMUSCULAR; INTRAVENOUS at 08:23

## 2021-02-20 RX ADMIN — PIPERACILLIN SODIUM AND TAZOBACTAM SODIUM 4500 MG: 4; .5 INJECTION, POWDER, LYOPHILIZED, FOR SOLUTION INTRAVENOUS at 02:29

## 2021-02-20 RX ADMIN — PIPERACILLIN AND TAZOBACTAM 3375 MG: 3; .375 INJECTION, POWDER, LYOPHILIZED, FOR SOLUTION INTRAVENOUS at 15:40

## 2021-02-20 RX ADMIN — HYDROCODONE BITARTRATE AND ACETAMINOPHEN 1 TABLET: 5; 325 TABLET ORAL at 05:38

## 2021-02-20 RX ADMIN — MORPHINE SULFATE 2 MG: 2 INJECTION, SOLUTION INTRAMUSCULAR; INTRAVENOUS at 00:47

## 2021-02-20 RX ADMIN — MORPHINE SULFATE 2 MG: 2 INJECTION, SOLUTION INTRAMUSCULAR; INTRAVENOUS at 23:06

## 2021-02-20 RX ADMIN — MAGNESIUM HYDROXIDE 30 ML: 400 SUSPENSION ORAL at 21:50

## 2021-02-20 RX ADMIN — VANCOMYCIN HYDROCHLORIDE 1250 MG: 5 INJECTION, POWDER, LYOPHILIZED, FOR SOLUTION INTRAVENOUS at 08:24

## 2021-02-20 RX ADMIN — HYDROCODONE BITARTRATE AND ACETAMINOPHEN 1 TABLET: 5; 325 TABLET ORAL at 20:26

## 2021-02-20 RX ADMIN — SODIUM CHLORIDE, PRESERVATIVE FREE 10 ML: 5 INJECTION INTRAVENOUS at 08:24

## 2021-02-20 RX ADMIN — HYDROCODONE BITARTRATE AND ACETAMINOPHEN 1 TABLET: 5; 325 TABLET ORAL at 16:01

## 2021-02-20 RX ADMIN — VANCOMYCIN HYDROCHLORIDE 1500 MG: 5 INJECTION, POWDER, LYOPHILIZED, FOR SOLUTION INTRAVENOUS at 18:27

## 2021-02-20 RX ADMIN — METOPROLOL TARTRATE 5 MG: 5 INJECTION INTRAVENOUS at 21:51

## 2021-02-20 RX ADMIN — SODIUM CHLORIDE: 9 INJECTION, SOLUTION INTRAVENOUS at 05:38

## 2021-02-20 RX ADMIN — HYDROCODONE BITARTRATE AND ACETAMINOPHEN 1 TABLET: 5; 325 TABLET ORAL at 11:58

## 2021-02-20 RX ADMIN — ENOXAPARIN SODIUM 40 MG: 100 INJECTION SUBCUTANEOUS at 20:26

## 2021-02-20 RX ADMIN — PIPERACILLIN AND TAZOBACTAM 3375 MG: 3; .375 INJECTION, POWDER, LYOPHILIZED, FOR SOLUTION INTRAVENOUS at 23:06

## 2021-02-20 RX ADMIN — MORPHINE SULFATE 2 MG: 2 INJECTION, SOLUTION INTRAMUSCULAR; INTRAVENOUS at 17:26

## 2021-02-20 ASSESSMENT — PAIN DESCRIPTION - PROGRESSION
CLINICAL_PROGRESSION: GRADUALLY WORSENING

## 2021-02-20 ASSESSMENT — PAIN SCALES - GENERAL
PAINLEVEL_OUTOF10: 6
PAINLEVEL_OUTOF10: 4
PAINLEVEL_OUTOF10: 6
PAINLEVEL_OUTOF10: 8
PAINLEVEL_OUTOF10: 6
PAINLEVEL_OUTOF10: 3

## 2021-02-20 ASSESSMENT — PAIN DESCRIPTION - PAIN TYPE: TYPE: ACUTE PAIN

## 2021-02-20 ASSESSMENT — PAIN DESCRIPTION - LOCATION: LOCATION: ABDOMEN

## 2021-02-20 NOTE — PROGRESS NOTES
Pharmacy Note  Vancomycin Consult - Follow Up     Vancomycin Therapy Day: 2  Current Dosinmg q8h   Current diagnosis for which MRSA is suspected/confirmed: Abdominal abscess  ONLY for suspected pneumonia or COPD: MRSA nasal swab   N/A: Non respiratory infection. .      Temp: 98.1F    Actual Weight:   Wt Readings from Last 1 Encounters:   21 (!) 334 lb (151.5 kg)       Recent Labs     21  0515   CREATININE 0.70 0.72       Recent Labs     21  0515   WBC 12.5* 13.7*         Intake/Output Summary (Last 24 hours) at 2021 1631  Last data filed at 2021 1716  Gross per 24 hour   Intake 1436 ml   Output --   Net 1436 ml           ASSESSMENT/PLAN    Trough drawn at 1552 was 11.6 which is subtherapeutic. Will increase dosing to 1500mg q8h      Thank you for the consult. Will Continue to follow.   Rolo Wolf  2021  4:31 PM

## 2021-02-20 NOTE — PROGRESS NOTES
Prior to the 3rd or 4th dose   Every 36 hr regimen Prior to the 3rd dose           Thank you for the consult. Pharmacy will continue to follow.   uLba Dove RPh/PharmD  2/19/2021  8:56 PM

## 2021-02-20 NOTE — CONSULTS
[facility]      Patient's Name/ Date of Birth/ Gender: Pedro Cardenas / 1975 (41 y.o.) / female     MRN/ACCOUNT #: [de-identified]    Referring Physician: Dr Machado Physician: Dr. Elena Rivas    History of present Illness: Pedro Cardenas is a 41 y.o. female, who presented to the hospital yesterday evening with complaints of cellulitis of her lower abdomen. She claims it was there for about a day. She claims it started off as a small lesion and increased in size. T-max 38.3. Patient currently getting vancomycin. CT scan was done showing no drainable fluid collection, does appear to be cellulitis/panniculitis. WBC 13.7    Past Medical History:   Past Medical History:   Diagnosis Date    PID (acute pelvic inflammatory disease) 2013       Past Surgical History:   Past Surgical History:   Procedure Laterality Date    CERVICAL FUSION      CERVICAL SPINE SURGERY         Social History:  reports that she has been smoking cigarettes. She has been smoking about 1.00 pack per day. She has never used smokeless tobacco. She reports current alcohol use. She reports current drug use. Frequency: 1.00 time per week. Drug: Marijuana. Family History: family history is not on file. Review of Systems:   General: +fever  HEENT: Denies sore throat, sinus problems, allergic rhinosinusitis  Card:  Denies chest pain, palpitations, orthopnea/PND. Denies h/o murmurs  Pulm: Denies cough, shortness of breath, JI  GI: per HPI; denies history of constipation, diarrhea  : Denies polyuria, dysuria, hematuria  Heme: Denies anemia, h/o bleeding or clotting problems. Neuro: Denies h/o CVA, TIA  Skin: +cellulitis  Musculoskeletal: Denies muscle, joint, back pain. Allergies: Patient has no known allergies.     Current Meds:  Current Facility-Administered Medications:     0.9 % sodium chloride infusion, , Intravenous, Continuous, Emilee Olson MD, Last Rate: 100 mL/hr at 02/19/21 3579, Rate Verify at 02/19/21 0810    HYDROcodone-acetaminophen (NORCO) 5-325 MG per tablet 1 tablet, 1 tablet, Oral, Q4H PRN, Emilee Olson MD, 1 tablet at 02/19/21 1859    morphine (PF) injection 2 mg, 2 mg, Intravenous, Q4H PRN, Emilee Olson MD, 2 mg at 02/19/21 1951    metoprolol (LOPRESSOR) injection 5 mg, 5 mg, Intravenous, Q6H PRN, Emilee Olson MD    acetaminophen (TYLENOL) tablet 650 mg, 650 mg, Oral, Q4H PRN, Emilee Olson MD, 650 mg at 02/19/21 1723    enoxaparin (LOVENOX) injection 40 mg, 40 mg, Subcutaneous, BID, Emilee Olson MD, 40 mg at 02/19/21 2108    nicotine (NICODERM CQ) 21 MG/24HR 1 patch, 1 patch, Transdermal, Daily, Emilee Olson MD    sodium chloride flush 0.9 % injection 10 mL, 10 mL, Intravenous, PRN, Jason Stein DO, 10 mL at 02/19/21 2021    vancomycin (VANCOCIN) intermittent dosing (placeholder), , Other, America Yang MD, Given at 02/19/21 2124    [START ON 2/20/2021] vancomycin (VANCOCIN) 1,250 mg in dextrose 5 % 250 mL IVPB, 1,250 mg, Intravenous, Q8H, Emilee Olson MD    Vital Signs:  Vitals:    02/19/21 1959   BP: (!) 147/86   Pulse: 67   Resp: 16   Temp: 97.3 °F (36.3 °C)   SpO2: 96%       Physical Exam:  Vital signs and Nurse's note reviewed  Gen:  A&Ox3, NAD  HEENT: NCAT, PERRLA, EOMI, no scleral icterus, oral mucosa moist  Neck: Supple, no thyroid enlargement, no cervical or supraclavicular lymphaedenopathy  Chest: Symmetric rise with inhalation, no evidence of trauma  CVS: Regular rate and rhythm  Resp: Good bilateral air entry, clear to auscultation b/l, no wheeze or rhonchi  Abd: soft, obese, anterior abdominal wall with cellulitis, no fluctuance or fluid collections noted, erythema and induration noted along pannus, tender to palpation  Ext: No clubbing, cyanosis, edema  CNS: Moves all extremities, no gross focal motor deficits    Labs:  CBC   Lab Results   Component Value Date    WBC 13.7 (H) 02/19/2021    HGB 12.9 02/19/2021    HCT 40.5 02/19/2021    MCV 94.4 02/19/2021     02/19/2021     BMP  Lab Results   Component Value Date    GLUCOSE 127 (H) 02/19/2021    CALCIUM 8.1 (L) 02/19/2021     02/19/2021    K 3.9 02/19/2021    CO2 24 02/19/2021     02/19/2021    BUN 17 02/19/2021    CREATININE 0.72 02/19/2021     PT/INR No results found for: INR, PROTIME  LFT No results found for: AST, ALT, ALKPHOS, ALBUMIN, AMYLASE, LIPASE    No results found. Problem List:  Patient Active Problem List    Diagnosis Date Noted    Cellulitis 02/18/2021       Impression:    Riya Keith is a 41 y.o. female with abdominal cellulitis/panniculitis    Recommendation:    1. Continue IV antibiotics. There is no drainable fluid collection on CAT scan or exam.  Appears to be panniculitis. No acute surgical intervention at this time. 2. Ok for heat to affected area      Electronically signed by Beryl Hernandes DO  on 2/19/2021 at 9:27 PM     General Surgery Attending Attestation Note    Patient was seen and examined. I agree with the above resident's exam, assessment and plan.      Panniculitis present  CT reviewed  No drainable abscess  Continue IV abx, add Zosyn    Will check HA1C   Pt has not had a PCP in quite some time    Shadia Driscoll, [address], [address], [facility],[address]  Office: [phone]  After Hours: Please call answering service

## 2021-02-21 LAB
ESTIMATED AVERAGE GLUCOSE: 126 MG/DL
HBA1C MFR BLD: 6 % (ref 4–6)

## 2021-02-21 PROCEDURE — 0J980ZZ DRAINAGE OF ABDOMEN SUBCUTANEOUS TISSUE AND FASCIA, OPEN APPROACH: ICD-10-PCS | Performed by: SURGERY

## 2021-02-21 PROCEDURE — 1200000000 HC SEMI PRIVATE

## 2021-02-21 PROCEDURE — 87075 CULTR BACTERIA EXCEPT BLOOD: CPT

## 2021-02-21 PROCEDURE — 87070 CULTURE OTHR SPECIMN AEROBIC: CPT

## 2021-02-21 PROCEDURE — 2500000003 HC RX 250 WO HCPCS: Performed by: SURGERY

## 2021-02-21 PROCEDURE — 6360000002 HC RX W HCPCS: Performed by: STUDENT IN AN ORGANIZED HEALTH CARE EDUCATION/TRAINING PROGRAM

## 2021-02-21 PROCEDURE — 83036 HEMOGLOBIN GLYCOSYLATED A1C: CPT

## 2021-02-21 PROCEDURE — 6360000002 HC RX W HCPCS: Performed by: FAMILY MEDICINE

## 2021-02-21 PROCEDURE — 2580000003 HC RX 258: Performed by: FAMILY MEDICINE

## 2021-02-21 PROCEDURE — 6370000000 HC RX 637 (ALT 250 FOR IP): Performed by: FAMILY MEDICINE

## 2021-02-21 PROCEDURE — 2580000003 HC RX 258: Performed by: STUDENT IN AN ORGANIZED HEALTH CARE EDUCATION/TRAINING PROGRAM

## 2021-02-21 PROCEDURE — 87205 SMEAR GRAM STAIN: CPT

## 2021-02-21 PROCEDURE — 36415 COLL VENOUS BLD VENIPUNCTURE: CPT

## 2021-02-21 RX ORDER — LIDOCAINE HYDROCHLORIDE 10 MG/ML
10 INJECTION, SOLUTION EPIDURAL; INFILTRATION; INTRACAUDAL; PERINEURAL ONCE
Status: COMPLETED | OUTPATIENT
Start: 2021-02-21 | End: 2021-02-21

## 2021-02-21 RX ADMIN — SODIUM CHLORIDE: 9 INJECTION, SOLUTION INTRAVENOUS at 00:11

## 2021-02-21 RX ADMIN — PIPERACILLIN AND TAZOBACTAM 3375 MG: 3; .375 INJECTION, POWDER, LYOPHILIZED, FOR SOLUTION INTRAVENOUS at 22:37

## 2021-02-21 RX ADMIN — HYDROCODONE BITARTRATE AND ACETAMINOPHEN 1 TABLET: 5; 325 TABLET ORAL at 20:32

## 2021-02-21 RX ADMIN — HYDROCODONE BITARTRATE AND ACETAMINOPHEN 1 TABLET: 5; 325 TABLET ORAL at 15:26

## 2021-02-21 RX ADMIN — MORPHINE SULFATE 2 MG: 2 INJECTION, SOLUTION INTRAMUSCULAR; INTRAVENOUS at 22:37

## 2021-02-21 RX ADMIN — HYDROCODONE BITARTRATE AND ACETAMINOPHEN 1 TABLET: 5; 325 TABLET ORAL at 02:47

## 2021-02-21 RX ADMIN — MORPHINE SULFATE 2 MG: 2 INJECTION, SOLUTION INTRAMUSCULAR; INTRAVENOUS at 07:52

## 2021-02-21 RX ADMIN — SODIUM CHLORIDE: 9 INJECTION, SOLUTION INTRAVENOUS at 15:27

## 2021-02-21 RX ADMIN — LIDOCAINE HYDROCHLORIDE 10 ML: 10 INJECTION, SOLUTION EPIDURAL; INFILTRATION; INTRACAUDAL; PERINEURAL at 10:51

## 2021-02-21 RX ADMIN — ENOXAPARIN SODIUM 40 MG: 100 INJECTION SUBCUTANEOUS at 20:32

## 2021-02-21 RX ADMIN — ENOXAPARIN SODIUM 40 MG: 100 INJECTION SUBCUTANEOUS at 07:52

## 2021-02-21 RX ADMIN — VANCOMYCIN HYDROCHLORIDE 1500 MG: 5 INJECTION, POWDER, LYOPHILIZED, FOR SOLUTION INTRAVENOUS at 10:52

## 2021-02-21 RX ADMIN — SENNOSIDES 8.6 MG: 8.6 TABLET, FILM COATED ORAL at 13:20

## 2021-02-21 RX ADMIN — VANCOMYCIN HYDROCHLORIDE 1500 MG: 5 INJECTION, POWDER, LYOPHILIZED, FOR SOLUTION INTRAVENOUS at 03:32

## 2021-02-21 RX ADMIN — MORPHINE SULFATE 2 MG: 2 INJECTION, SOLUTION INTRAMUSCULAR; INTRAVENOUS at 13:20

## 2021-02-21 RX ADMIN — HYDROMORPHONE HYDROCHLORIDE 0.5 MG: 1 INJECTION, SOLUTION INTRAMUSCULAR; INTRAVENOUS; SUBCUTANEOUS at 09:33

## 2021-02-21 RX ADMIN — PIPERACILLIN AND TAZOBACTAM 3375 MG: 3; .375 INJECTION, POWDER, LYOPHILIZED, FOR SOLUTION INTRAVENOUS at 14:39

## 2021-02-21 RX ADMIN — PIPERACILLIN AND TAZOBACTAM 3375 MG: 3; .375 INJECTION, POWDER, LYOPHILIZED, FOR SOLUTION INTRAVENOUS at 06:48

## 2021-02-21 RX ADMIN — DIPHENHYDRAMINE HCL 25 MG: 25 TABLET ORAL at 20:32

## 2021-02-21 RX ADMIN — MORPHINE SULFATE 2 MG: 2 INJECTION, SOLUTION INTRAMUSCULAR; INTRAVENOUS at 18:27

## 2021-02-21 RX ADMIN — HYDROCODONE BITARTRATE AND ACETAMINOPHEN 1 TABLET: 5; 325 TABLET ORAL at 06:48

## 2021-02-21 RX ADMIN — VANCOMYCIN HYDROCHLORIDE 1500 MG: 5 INJECTION, POWDER, LYOPHILIZED, FOR SOLUTION INTRAVENOUS at 18:56

## 2021-02-21 RX ADMIN — MORPHINE SULFATE 2 MG: 2 INJECTION, SOLUTION INTRAMUSCULAR; INTRAVENOUS at 03:44

## 2021-02-21 RX ADMIN — HYDROCODONE BITARTRATE AND ACETAMINOPHEN 1 TABLET: 5; 325 TABLET ORAL at 10:56

## 2021-02-21 RX ADMIN — DOCUSATE SODIUM 100 MG: 100 CAPSULE, LIQUID FILLED ORAL at 13:20

## 2021-02-21 ASSESSMENT — PAIN DESCRIPTION - LOCATION
LOCATION: ABDOMEN

## 2021-02-21 ASSESSMENT — PAIN DESCRIPTION - DESCRIPTORS
DESCRIPTORS: DISCOMFORT
DESCRIPTORS: SORE
DESCRIPTORS: SORE
DESCRIPTORS: DISCOMFORT;THROBBING
DESCRIPTORS: SORE

## 2021-02-21 ASSESSMENT — PAIN DESCRIPTION - FREQUENCY: FREQUENCY: CONTINUOUS

## 2021-02-21 ASSESSMENT — PAIN SCALES - GENERAL
PAINLEVEL_OUTOF10: 6
PAINLEVEL_OUTOF10: 8
PAINLEVEL_OUTOF10: 7
PAINLEVEL_OUTOF10: 3
PAINLEVEL_OUTOF10: 3

## 2021-02-21 ASSESSMENT — PAIN DESCRIPTION - ORIENTATION
ORIENTATION: LOWER
ORIENTATION: MID;LOWER

## 2021-02-21 ASSESSMENT — PAIN DESCRIPTION - ONSET: ONSET: ON-GOING

## 2021-02-21 ASSESSMENT — PAIN DESCRIPTION - PAIN TYPE
TYPE: ACUTE PAIN

## 2021-02-21 ASSESSMENT — PAIN - FUNCTIONAL ASSESSMENT: PAIN_FUNCTIONAL_ASSESSMENT: ACTIVITIES ARE NOT PREVENTED

## 2021-02-21 NOTE — PLAN OF CARE
Problem:  Body Temperature - Imbalanced:  Goal: Ability to maintain a body temperature in the normal range will improve  Description: Ability to maintain a body temperature in the normal range will improve  2/21/2021 0910 by Brigette Alva RN  Outcome: Ongoing  Note: Afebrile, on antibiotics  2/21/2021 0426 by Janell Rodriguez RN  Outcome: Ongoing     Problem: Pain:  Goal: Control of acute pain  Description: Control of acute pain  2/21/2021 0910 by Brigette Alva RN  Outcome: Ongoing  Note: Pt reports adequate pain control with current meds  2/21/2021 0426 by Janell Rodriguez RN  Outcome: Ongoing

## 2021-02-21 NOTE — PROGRESS NOTES
General Surgery:  Daily Progress Note                 PATIENT NAME: Elizabeth Ramos     TODAY'S DATE: 2/21/2021, 9:04 AM    SUBJECTIVE:     Pt seen and examined at bedside. No acute events overnight. Afebrile. Pt states overall pain is tolerable. Claims erythema area is enlarging. Denies fever, chills, nausea, vomiting, chest pain, and SOB. OBJECTIVE:   VITALS:  /60   Pulse 65   Temp 97.7 °F (36.5 °C) (Oral)   Resp 15   Ht 5' 5\" (1.651 m)   Wt (!) 334 lb (151.5 kg)   LMP 02/01/2021   SpO2 95%   BMI 55.58 kg/m²      INTAKE/OUTPUT:    No intake or output data in the 24 hours ending 02/21/21 0904    PHYSICAL EXAM:  General Appearance:  awake, alert, oriented, in no acute distress  Lungs:  Normal expansion. Clear to auscultation. No rales, rhonchi, or wheezing. Heart:  Heart regular rate and rhythm  Abdomen:  soft, ND, tender in lower abdomen near induration, erythema spreading past previous markings, no blisters or boils. Data:  CBC with Differential:    Lab Results   Component Value Date    WBC 13.7 02/19/2021    RBC 4.29 02/19/2021    HGB 12.9 02/19/2021    HCT 40.5 02/19/2021     02/19/2021    MCV 94.4 02/19/2021    MCH 30.1 02/19/2021    MCHC 31.9 02/19/2021    RDW 12.7 02/19/2021    LYMPHOPCT 16 02/19/2021    MONOPCT 6 02/19/2021    BASOPCT 0 02/19/2021    MONOSABS 0.86 02/19/2021    LYMPHSABS 2.14 02/19/2021    EOSABS 0.04 02/19/2021    BASOSABS 0.05 02/19/2021    DIFFTYPE NOT REPORTED 02/19/2021     BMP:    Lab Results   Component Value Date     02/19/2021    K 3.9 02/19/2021     02/19/2021    CO2 24 02/19/2021    BUN 17 02/19/2021    CREATININE 0.72 02/19/2021    CALCIUM 8.1 02/19/2021    GFRAA >60 02/19/2021    LABGLOM >60 02/19/2021    GLUCOSE 127 02/19/2021       ASSESSMENT:  Active Hospital Problems    Diagnosis Date Noted    Cellulitis [L03.90] 02/18/2021       41 y.o. female with panniculitis    Plan:  1. Medical mgt per primary  2. Ok for diet  3.  Will do I&D at bedside today for area of induration. Erythema is spreading and WBC inc. Patient agreeable. BID packing changes at I&D site once completed. 4. Pain control  5. Continue antibiotics     Electronically signed by Nathanael Meza DO  on 2/21/2021 at 9:04 AM    General Surgery Attending Attestation Note    Patient was seen and examined. I agree with the above resident's exam, assessment and plan.      No drainable fluid collection, however cellulitis is getting worse in spite of adding zosyn to vanco  Will incise and drain area of most dependent induration as this might be a MRSA abscess    Vasu Lam DO [facility], [address] [place] [address], [facility],[address]  Office: [phone]  After Hours: Please call answering service

## 2021-02-21 NOTE — PROCEDURES
Procedure Note     Pre-Operative Diagnosis: abdominal wall cellulitis and induration     Post-Operative Diagnosis: same     Surgeon: Dr Rich Flores DO     Assistant: Ava Mcfadden DO, PGY 4    Procedure: incision and drainage     Anesthesia: local 1% lidocaine    Drains: None. Complications: None. Specimens: Cultures x2    Surgical Fluid Loss: EBL: 1cc     Clinic Note:    Gross Findings: patient has worsening induration and erythema to panniculitis near the suprapubic area. Procedure in Detail: procedure, benefits and risks explained to the patient who understood. Consent obtained. Area prepped and draped in usual sterile fashion. Using an 11 blade incision was made at the site of induration near the pubis. Bluntly probed to break up any loculations or pockets. No purulence noted. The area was irrigated and packing was placed. The patient tolerated the procedure well. Continue BID packing changes to area. Continue to leeann erythema for any further spreading. Electronically signed by Ava Mcfadden DO on 2/21/2021 at 9:17 AM        General Surgery Attending Attestation Note    Agree with above.  Supervised case remotely and was immediately available for issues  __________________________________________    [facility], [address] [facility], [address]  Office: [phone]  Pager: [phone]  ___________________________________________

## 2021-02-21 NOTE — H&P
History & Physical  [facility]    Adult Hospitalist      Name: Melvin Lynch  MRN: [id number]     Acct: [de-identified]  Room: [facility]/1012-02    Admit Date: 2/18/2021  7:45 PM  PCP: Rodger Edge, DO    Primary Problem  Active Problems:    Cellulitis  Resolved Problems:    * No resolved hospital problems. *        Assesment:     · Cellulitis, lower abdominal wall  · Panniculitis - worsening   · Bacteremia   · ? Sepsis   · Question MRSA  · Question abscess - ruled out   · Tachycardia  · Acute Dehydration - resolved  · Morbid obesity   · Nicotine dependence         Plan:     · Admit MS  · Monitor vitals closely  · Keep SpO2 above 90%  · Is/ Os  · IV fluids  · Pain control  · Morphine IV prn  · Norco PO prn  · DC Ancef  · Start Vanco  · Consult Pharmacy to dose Vancomycin  · Consult Surgery   · Joby edges of cellulitis   · Warm moist packs prn  · Nicoderm   · Up in chair  · Ambulate  · Lovenox for Px  · IS  · DVT and GI prophylaxis. · HbA1C  · Zosyn IV added        Chief Complaint:     Chief Complaint   Patient presents with    Abscess     lower abdomen         History of Present Illness:        Pt seen and examined at bedside  Erythema is milder  However it has extended beyond the marked line yesterday  Abscess ruled out per CT  There is induration mid left lower abdomen   Surgery have added Zosyn too already      Initial HPI  Melvin Lynch is a 41 y.o.  female who presents with Abscess (lower abdomen)    Pt presented to the [facility] ED with c/o redness that extended from a small area over her left lower abdomen. Pt says she noted there was a 'boil' over there just a day ago. She says she did not even realize and the next day felt the redness spread very rapidly over the whole lower anterior abdomen which concerned her. Pt says she has also felt febrile. She felt nauseous and was diaphoretic. She denies any vomiting, headache, vision change, diarrhea.  She says there is tenderness, warmth  And redness over the affected area that worsened rapidly within a day. Pt is also found to be tachycardiac and felt thirsty and weak. Pt denies any dyspnea, cough, orthopnea, joint swelling    I have personally reviewed the past medical history, past surgical history, medications, social history, and family history, and summarized in the note. Review of Systems:     All 10 point system is reviewed and negative otherwise mentioned in HPI. Past Medical History:     Past Medical History:   Diagnosis Date    PID (acute pelvic inflammatory disease)         Past Surgical History:     Past Surgical History:   Procedure Laterality Date    CERVICAL FUSION      CERVICAL SPINE SURGERY          Medications Prior to Admission:       Prior to Admission medications    Not on File        Allergies:       Patient has no known allergies. Social History:     Tobacco:    reports that she has been smoking cigarettes. She has been smoking about 1.00 pack per day. She has never used smokeless tobacco.  Alcohol:      reports current alcohol use. Drug Use:  reports current drug use. Frequency: 1.00 time per week. Drug: Marijuana. Family History:     History reviewed. No pertinent family history.       Physical Exam:     Vitals:  BP (!) 140/83   Pulse 63   Temp 98.1 °F (36.7 °C) (Oral)   Resp 14   Ht 5' 5\" (1.651 m)   Wt (!) 334 lb (151.5 kg)   LMP 2021   SpO2 95%   BMI 55.58 kg/m²   Temp (24hrs), Av.9 °F (36.6 °C), Min:97.3 °F (36.3 °C), Max:98.2 °F (36.8 °C)          General appearance - alert, well appearing, and in no acute distress  Mental status - oriented to person, place, and time with normal affect  Head - normocephalic and atraumatic  Eyes - pupils equal and reactive, extraocular eye movements intact, conjunctiva clear  Ears - hearing appears to be intact  Nose - no drainage noted  Mouth - mucous membranes moist  Neck - supple, no carotid bruits, thyroid not palpable  Chest - clear to auscultation, normal effort  Heart - normal rate, regular rhythm, no murmur  Abdomen - soft, obese, tender, nondistended, bowel sounds present all four quadrants, no masses, hepatomegaly or splenomegaly. Erythema over large area lower quadrant, tender, warm, indurated with localized pointing in left to mid abdomen  Neurological - normal speech, no focal findings or movement disorder noted, cranial nerves II through XII grossly intact  Extremities - peripheral pulses palpable, no pedal edema or calf pain with palpation  Skin - rash extensively with induration as noted worse since yesterday         Data:     Labs:    Hematology:  Recent Labs     02/18/21 2003 02/19/21  0515   WBC 12.5* 13.7*   RBC 4.71 4.29   HGB 14.4 12.9   HCT 43.2 40.5   MCV 91.7 94.4   MCH 30.7 30.1   MCHC 33.4 31.9   RDW 13.0 12.7    239   MPV 8.6 10.9     Chemistry:  Recent Labs     02/18/21 2003 02/19/21  0515    136   K 3.9 3.9    102   CO2 27 24   GLUCOSE 122* 127*   BUN 12 17   CREATININE 0.70 0.72   ANIONGAP 10 10   LABGLOM >60 >60   GFRAA >60 >60   CALCIUM 8.8 8.1*     No results for input(s): PROT, LABALBU, LABA1C, S0EWNHF, P7ITZYJ, FT4, TSH, AST, ALT, LDH, GGT, ALKPHOS, LABGGT, BILITOT, BILIDIR, AMMONIA, AMYLASE, LIPASE, LACTATE, CHOL, HDL, LDLCHOLESTEROL, CHOLHDLRATIO, TRIG, VLDL, BWL68UH, PHENYTOIN, PHENYF, URICACID, POCGLU in the last 72 hours. No results found for: INR, PROTIME    Lab Results   Component Value Date/Time    SPECIAL RIGHT FOREARM 20ML 02/18/2021 08:20 PM     Lab Results   Component Value Date/Time    CULTURE NO GROWTH 2 DAYS 02/18/2021 08:20 PM       No results found for: POCPH, PHART, PH, POCPCO2, NIL6XDE, PCO2, POCPO2, PO2ART, PO2, POCHCO3, FAF3RZU, HCO3, NBEA, PBEA, BEART, BE, THGBART, THB, FEF1JYB, UIZC2XXR, K2HSPDWP, O2SAT, FIO2    Radiology:    No results found.       All radiological studies reviewed                Code Status:  Full Code    Electronically signed by Dolores Bhatia MD on 2/20/2021 at 7:05 PM Copy sent to Dr. Adriana Nevarez, DO    This note was created with the assistance of a speech-recognition program.  Although the intention is to generate a document that actually reflects the content of the visit, no guarantees can be provided that every mistake has been identified and corrected by editing. Note was updated later by me after  physical examination and  completion of the assessment.

## 2021-02-22 PROCEDURE — 2580000003 HC RX 258: Performed by: FAMILY MEDICINE

## 2021-02-22 PROCEDURE — 6370000000 HC RX 637 (ALT 250 FOR IP): Performed by: FAMILY MEDICINE

## 2021-02-22 PROCEDURE — 99254 IP/OBS CNSLTJ NEW/EST MOD 60: CPT | Performed by: INTERNAL MEDICINE

## 2021-02-22 PROCEDURE — 1200000000 HC SEMI PRIVATE

## 2021-02-22 PROCEDURE — 6360000002 HC RX W HCPCS: Performed by: STUDENT IN AN ORGANIZED HEALTH CARE EDUCATION/TRAINING PROGRAM

## 2021-02-22 PROCEDURE — 6360000002 HC RX W HCPCS: Performed by: FAMILY MEDICINE

## 2021-02-22 PROCEDURE — 2580000003 HC RX 258: Performed by: STUDENT IN AN ORGANIZED HEALTH CARE EDUCATION/TRAINING PROGRAM

## 2021-02-22 RX ORDER — OXYCODONE HYDROCHLORIDE AND ACETAMINOPHEN 5; 325 MG/1; MG/1
1 TABLET ORAL EVERY 4 HOURS PRN
Status: DISCONTINUED | OUTPATIENT
Start: 2021-02-22 | End: 2021-02-26 | Stop reason: HOSPADM

## 2021-02-22 RX ADMIN — HYDROCODONE BITARTRATE AND ACETAMINOPHEN 1 TABLET: 5; 325 TABLET ORAL at 10:04

## 2021-02-22 RX ADMIN — OXYCODONE AND ACETAMINOPHEN 1 TABLET: 5; 325 TABLET ORAL at 20:07

## 2021-02-22 RX ADMIN — VANCOMYCIN HYDROCHLORIDE 1500 MG: 5 INJECTION, POWDER, LYOPHILIZED, FOR SOLUTION INTRAVENOUS at 11:57

## 2021-02-22 RX ADMIN — ENOXAPARIN SODIUM 40 MG: 100 INJECTION SUBCUTANEOUS at 09:51

## 2021-02-22 RX ADMIN — HYDROCODONE BITARTRATE AND ACETAMINOPHEN 1 TABLET: 5; 325 TABLET ORAL at 14:49

## 2021-02-22 RX ADMIN — MORPHINE SULFATE 2 MG: 2 INJECTION, SOLUTION INTRAMUSCULAR; INTRAVENOUS at 12:57

## 2021-02-22 RX ADMIN — PIPERACILLIN AND TAZOBACTAM 3375 MG: 3; .375 INJECTION, POWDER, LYOPHILIZED, FOR SOLUTION INTRAVENOUS at 14:40

## 2021-02-22 RX ADMIN — MORPHINE SULFATE 2 MG: 2 INJECTION, SOLUTION INTRAMUSCULAR; INTRAVENOUS at 22:34

## 2021-02-22 RX ADMIN — VANCOMYCIN HYDROCHLORIDE 1500 MG: 5 INJECTION, POWDER, LYOPHILIZED, FOR SOLUTION INTRAVENOUS at 19:10

## 2021-02-22 RX ADMIN — MORPHINE SULFATE 2 MG: 2 INJECTION, SOLUTION INTRAMUSCULAR; INTRAVENOUS at 02:40

## 2021-02-22 RX ADMIN — PIPERACILLIN AND TAZOBACTAM 3375 MG: 3; .375 INJECTION, POWDER, LYOPHILIZED, FOR SOLUTION INTRAVENOUS at 06:42

## 2021-02-22 RX ADMIN — ENOXAPARIN SODIUM 40 MG: 100 INJECTION SUBCUTANEOUS at 20:07

## 2021-02-22 RX ADMIN — VANCOMYCIN HYDROCHLORIDE 1500 MG: 5 INJECTION, POWDER, LYOPHILIZED, FOR SOLUTION INTRAVENOUS at 02:40

## 2021-02-22 RX ADMIN — PIPERACILLIN AND TAZOBACTAM 3375 MG: 3; .375 INJECTION, POWDER, LYOPHILIZED, FOR SOLUTION INTRAVENOUS at 22:34

## 2021-02-22 RX ADMIN — MORPHINE SULFATE 2 MG: 2 INJECTION, SOLUTION INTRAMUSCULAR; INTRAVENOUS at 17:52

## 2021-02-22 RX ADMIN — MORPHINE SULFATE 2 MG: 2 INJECTION, SOLUTION INTRAMUSCULAR; INTRAVENOUS at 07:45

## 2021-02-22 RX ADMIN — HYDROCODONE BITARTRATE AND ACETAMINOPHEN 1 TABLET: 5; 325 TABLET ORAL at 01:18

## 2021-02-22 RX ADMIN — HYDROCODONE BITARTRATE AND ACETAMINOPHEN 1 TABLET: 5; 325 TABLET ORAL at 05:26

## 2021-02-22 ASSESSMENT — PAIN SCALES - GENERAL
PAINLEVEL_OUTOF10: 7
PAINLEVEL_OUTOF10: 7
PAINLEVEL_OUTOF10: 6
PAINLEVEL_OUTOF10: 6
PAINLEVEL_OUTOF10: 8
PAINLEVEL_OUTOF10: 8
PAINLEVEL_OUTOF10: 7

## 2021-02-22 ASSESSMENT — PAIN DESCRIPTION - DESCRIPTORS
DESCRIPTORS: ACHING;DISCOMFORT
DESCRIPTORS: DISCOMFORT
DESCRIPTORS: DISCOMFORT
DESCRIPTORS: DISCOMFORT;ACHING

## 2021-02-22 ASSESSMENT — PAIN DESCRIPTION - FREQUENCY
FREQUENCY: CONTINUOUS

## 2021-02-22 ASSESSMENT — PAIN DESCRIPTION - LOCATION
LOCATION: ABDOMEN
LOCATION: ABDOMEN

## 2021-02-22 ASSESSMENT — PAIN DESCRIPTION - ONSET
ONSET: ON-GOING

## 2021-02-22 ASSESSMENT — PAIN DESCRIPTION - ORIENTATION
ORIENTATION: MID;LOWER
ORIENTATION: LOWER;MID
ORIENTATION: MID;LOWER

## 2021-02-22 ASSESSMENT — PAIN DESCRIPTION - PAIN TYPE
TYPE: ACUTE PAIN
TYPE: ACUTE PAIN

## 2021-02-22 NOTE — CARE COORDINATION
Pt had bedside I&D abd wall cellulitis 2-21 per Dr. Samuel Marie. ID following and currently on IV Vanco and Zosyn. Agreeable to [address] [place] [address] for abd dressing changes and referral called to [facility]. Verifying insurance. JUSTICE initiated. Pt requesting bedside commode and nurse will get script from Dr. Inocencia Mccall.

## 2021-02-22 NOTE — CONSULTS
[facility]  Initial Consult Note  Date: 2/22/2021    Hospital day :4     Impression:   1. Anterior abdominal wall/mons pubis area abscess with associated cellulitis  2. Morbid obesity    Recommendations   · Continue intravenous antimicrobial therapy and the clinical picture seems most consistent with a staphylococcal infection. · There was an indurated area with no fluctuance appreciated and no clinical evidence of necrotizing soft tissue infection. · The patient is on Zosyn and vancomycin and at this point in time this needs continued IV antimicrobial therapy until we can see a clinical response to the therapy. · We will continue to monitor response to therapy    Chief complaint/reason for consultation:   Panniculitis    History of Present Illness:   Riya Keith is a 41y.o.-year-old female who was initially admitted on 2/18/2021. Lilia was in her usual state of health until Wednesday when she reports a bump in the mons pubis area that was very hard, red and swollen. By Thursday she reports that it was enlarging and became like baseball sized by noon. The patient then reports around 5/6 PM it had doubled in size and she was having some associated fevers and chills. She does not report any prior soft tissue infections especially not with MRSA. She ended up going to the emergency department as she was developing cellulitis of the lower abdomen and the patient had blood cultures done started on IV antimicrobial therapy and was transferred to [facility] for admission. The patient was seen by the general surgery team and a CAT scan of the abdomen pelvis had been done showing no drainable fluid collection. The patient had initially been started on vancomycin and Zosyn was added. The patient's abdominal wall cellulitic changes had persisted and I was asked to evaluate and help with antibiotic choice.     I have personally reviewed the past medical history, past surgical history, medications, social history, and family history, and I have updated the database accordingly.   Past Medical History:     Past Medical History:   Diagnosis Date    PID (acute pelvic inflammatory disease) 2013     Past Surgical  History:     Past Surgical History:   Procedure Laterality Date    CERVICAL FUSION      CERVICAL SPINE SURGERY       Medications:      piperacillin-tazobactam  3,375 mg Intravenous Q8H    vancomycin  1,500 mg Intravenous Q8H    enoxaparin  40 mg Subcutaneous BID    nicotine  1 patch Transdermal Daily    vancomycin (VANCOCIN) intermittent dosing (placeholder)   Other RX Placeholder     Social History:     Social History     Socioeconomic History    Marital status: Single     Spouse name: Not on file    Number of children: Not on file    Years of education: Not on file    Highest education level: Not on file   Occupational History    Not on file   Social Needs    Financial resource strain: Not on file    Food insecurity     Worry: Not on file     Inability: Not on file    Transportation needs     Medical: Not on file     Non-medical: Not on file   Tobacco Use    Smoking status: Current Every Day Smoker     Packs/day: 1.00     Types: Cigarettes    Smokeless tobacco: Never Used   Substance and Sexual Activity    Alcohol use: Yes     Frequency: Monthly or less    Drug use: Yes     Frequency: 1.0 times per week     Types: Marijuana     Comment: gets it from dispensary in [place] [address] Sexual activity: Not on file   Lifestyle    Physical activity     Days per week: Not on file     Minutes per session: Not on file    Stress: Not on file   Relationships    Social connections     Talks on phone: Not on file     Gets together: Not on file     Attends Christian service: Not on file     Active member of club or organization: Not on file     Attends meetings of clubs or organizations: Not on file     Relationship status: Not on file    Intimate partner violence     Fear of current or ex partner: Not on file     Emotionally abused: Not on file     Physically abused: Not on file     Forced sexual activity: Not on file   Other Topics Concern    Not on file   Social History Narrative    Not on file     Family History:   History reviewed. No pertinent family history. Allergies:   Patient has no known allergies. Review of Systems:   General: No fevers or chills. Eyes: No double vision or blurry vision. ENT: No sore throat or runny nose. Cardiovascular: No chest pain or palpitations. Lung: No shortness of breath or cough. Abdomen: He does have abdominal wall pain/discomfort, no nausea vomiting or diarrhea  Genitourinary: No increased urinary frequency, or dysuria. Musculoskeletal: No muscle aches or pains. Hematologic: No bleeding or bruising. Neurologic: No headache, weakness, numbness, or tingling. Physical Examination :   BP (!) 148/66   Pulse 56   Temp 98 °F (36.7 °C) (Oral)   Resp 15   Ht 5' 5\" (1.651 m)   Wt (!) 334 lb (151.5 kg)   LMP 2021   SpO2 94%   BMI 55.58 kg/m²     Temperature Range: Temp: 98 °F (36.7 °C) Temp  Av.1 °F (36.7 °C)  Min: 97.9 °F (36.6 °C)  Max: 98.6 °F (37 °C)  General Appearance: alert and oriented to person, place and time, well-developed and well-nourished, in no acute distress and Awake, alert, and in no apparent distress  Head: Normocephalic, without obvious abnormality, atraumatic  Eyes: Pupils equal, round, reactive, to light and accommodation; extraocular movements intact; sclera anicteric; conjunctivae pink  ENT: Oropharynx clear, without erythema, exudate, or thrush. Neck: neck supple and non tender without mass, no thyromegaly or thyroid nodules, no cervical lymphadenopathy   Pulmonary/Chest: Clear to auscultation, without wheezes, rales, or rhonchi  Cardiovascular: Regular rate and rhythm without murmurs, rubs, or gallops.    Abdomen: Obese abdomen, diffuse erythroderma in the lower third of the abdominal wall Aerobic [[id number]] (Abnormal) Collected: 02/21/21 0920   Order Status: Completed Specimen: Abscess Updated: 02/21/21 1411    Specimen Description . ABSCESS    Special Requests NOT REPORTED    Direct Exam RARE NEUTROPHILSAbnormal      NO BACTERIA SEEN    Culture PENDING           Thank you for allowing us to participate in the care of this patient. Please call with questions. Electronically signed by Carmen Minor MD on 2/22/2021 at 12:11 PM      [facility]  [address] messaging  OFFICE: [phone]      This note is created with the assistance of a speech recognition program.  While intending to generate a document that actually reflects the content of the visit, the document can still have some errors including those of syntax and sound a like substitutions which may escape proof reading. In such instances, actual meaning can be extrapolated by contextual diversion.

## 2021-02-22 NOTE — CARE COORDINATION
Call from Gauri with [facility] and they can accept pt for [facility] [place], [facility]..

## 2021-02-22 NOTE — PROGRESS NOTES
General Surgery:  Daily Progress Note                 PATIENT NAME: Max Rhodes     TODAY'S DATE: 2/22/2021, 6:16 AM    SUBJECTIVE:     Pt seen and examined at bedside. No acute events overnight. Afebrile. Pt states overall pain is tolerable. Claims erythema area is not improving still. Denies fever, chills, nausea, vomiting, chest pain, and SOB. OBJECTIVE:   VITALS:  BP (!) 147/72   Pulse 74   Temp 98.1 °F (36.7 °C) (Oral)   Resp 17   Ht 5' 5\" (1.651 m)   Wt (!) 334 lb (151.5 kg)   LMP 02/01/2021   SpO2 96%   BMI 55.58 kg/m²      INTAKE/OUTPUT:      Intake/Output Summary (Last 24 hours) at 2/22/2021 0616  Last data filed at 2/21/2021 1820  Gross per 24 hour   Intake 1740.63 ml   Output --   Net 1740.63 ml       PHYSICAL EXAM:  General Appearance:  awake, alert, oriented, in no acute distress  Lungs:  Normal expansion. No inc WOB  Heart:  Heart regular rate and rhythm  Abdomen:  soft, ND, tender in lower abdomen near induration, erythema spreading past previous markings, no blisters or boils.  I&D site clean and dry, packing changed      Data:  CBC with Differential:    Lab Results   Component Value Date    WBC 13.7 02/19/2021    RBC 4.29 02/19/2021    HGB 12.9 02/19/2021    HCT 40.5 02/19/2021     02/19/2021    MCV 94.4 02/19/2021    MCH 30.1 02/19/2021    MCHC 31.9 02/19/2021    RDW 12.7 02/19/2021    LYMPHOPCT 16 02/19/2021    MONOPCT 6 02/19/2021    BASOPCT 0 02/19/2021    MONOSABS 0.86 02/19/2021    LYMPHSABS 2.14 02/19/2021    EOSABS 0.04 02/19/2021    BASOSABS 0.05 02/19/2021    DIFFTYPE NOT REPORTED 02/19/2021     BMP:    Lab Results   Component Value Date     02/19/2021    K 3.9 02/19/2021     02/19/2021    CO2 24 02/19/2021    BUN 17 02/19/2021    CREATININE 0.72 02/19/2021    CALCIUM 8.1 02/19/2021    GFRAA >60 02/19/2021    LABGLOM >60 02/19/2021    GLUCOSE 127 02/19/2021       ASSESSMENT:  Active Hospital Problems    Diagnosis Date Noted    Cellulitis [L03.90] 02/18/2021       41 y.o. female with panniculitis    Plan:  1. Medical mgt per primary  2. Ok for diet  3. BID packing changes   4. Pain control  5. Continue antibiotics, ID consulted     Electronically signed by Susu Vuong DO  on 2/22/2021 at [place] [address] Surgery Attending Attestation Note    Patient was seen and examined. I agree with the above resident's exam, assessment and plan.      Erythema continuing to worsen despite incision and drainage  Consult ID for opinion regarding antibiotics    Ronnie Alexander, [address], [address], [facility]  Office: [phone]  After Hours: Please call answering service

## 2021-02-22 NOTE — PLAN OF CARE
Problem: Skin Integrity - Impaired:  Goal: Will show no infection signs and symptoms  Description: Will show no infection signs and symptoms  2/22/2021 1352 by Denis Calloway RN  Note: Monitoring labs, vitals, ID and surgical consult, IV atb  2/22/2021 0302 by Mellissa Barajas RN  Outcome: Ongoing

## 2021-02-22 NOTE — PROGRESS NOTES
[facility].,    Adult Hospitalist      Name: Mariajose Spann  MRN: [id number]     Acct: [de-identified]  Room: 2006/2006-02    Admit Date: 2/18/2021  7:45 PM  PCP: Ganesh Naylor, DO    Primary Problem  Active Problems:    Cellulitis  Resolved Problems:    * No resolved hospital problems. *        Assesment:     · Cellulitis, lower abdominal wall  · Panniculitis - worsening   · Bacteremia   · Question MRSA  · Question abscess - ruled out   · Tachycardia  · Acute Dehydration - resolved  · Morbid obesity   · Nicotine dependence         Plan:     · Admit MS  · Monitor vitals closely  · Keep SpO2 above 90%  · Is/ Os  · IV fluids  · Pain control  · Morphine IV prn  · Norco PO prn  · DC Ancef  · Start Vanco  · Consult Pharmacy to dose Vancomycin  · Consult Surgery   · Joby edges of cellulitis   · Warm moist packs prn  · Nicoderm   · Up in chair  · Ambulate  · Lovenox for Px  · IS  · DVT and GI prophylaxis. · HbA1C  · Zosyn IV added  · S/p I&D 2/21/21  · Consult ID        Chief Complaint:     Chief Complaint   Patient presents with    Abscess     lower abdomen         History of Present Illness:        Pt seen and examined at bedside  Panniculitis worsening  No sig dx from incision  Cx taken  Up in chair  Ambulating  Wt mgmt in future  Dehydration resolved  DC IVF      Initial HPI  Mariajose Spann is a 41 y.o.  female who presents with Abscess (lower abdomen)    Pt presented to the [facility] ED with c/o redness that extended from a small area over her left lower abdomen. Pt says she noted there was a 'boil' over there just a day ago. She says she did not even realize and the next day felt the redness spread very rapidly over the whole lower anterior abdomen which concerned her. Pt says she has also felt febrile. She felt nauseous and was diaphoretic. She denies any vomiting, headache, vision change, diarrhea. She says there is tenderness, warmth  And redness over the affected area that worsened rapidly within a day.  Pt is also found to be tachycardiac and felt thirsty and weak. Pt denies any dyspnea, cough, orthopnea, joint swelling    I have personally reviewed the past medical history, past surgical history, medications, social history, and family history, and summarized in the note. Review of Systems:     All 10 point system is reviewed and negative otherwise mentioned in HPI. Past Medical History:     Past Medical History:   Diagnosis Date    PID (acute pelvic inflammatory disease)         Past Surgical History:     Past Surgical History:   Procedure Laterality Date    CERVICAL FUSION      CERVICAL SPINE SURGERY          Medications Prior to Admission:       Prior to Admission medications    Not on File        Allergies:       Patient has no known allergies. Social History:     Tobacco:    reports that she has been smoking cigarettes. She has been smoking about 1.00 pack per day. She has never used smokeless tobacco.  Alcohol:      reports current alcohol use. Drug Use:  reports current drug use. Frequency: 1.00 time per week. Drug: Marijuana. Family History:     History reviewed. No pertinent family history.       Physical Exam:     Vitals:  BP (!) 157/84   Pulse 66   Temp 98 °F (36.7 °C) (Oral)   Resp 16   Ht 5' 5\" (1.651 m)   Wt (!) 334 lb (151.5 kg)   LMP 2021   SpO2 97%   BMI 55.58 kg/m²   Temp (24hrs), Av.8 °F (36.6 °C), Min:97.3 °F (36.3 °C), Max:98.1 °F (36.7 °C)          General appearance - alert, well appearing, and in no acute distress  Mental status - oriented to person, place, and time with normal affect  Head - normocephalic and atraumatic  Eyes - pupils equal and reactive, extraocular eye movements intact, conjunctiva clear  Ears - hearing appears to be intact  Nose - no drainage noted  Mouth - mucous membranes moist  Neck - supple, no carotid bruits, thyroid not palpable  Chest - clear to auscultation, normal effort  Heart - normal rate, regular rhythm, no murmur  Abdomen - soft, obese, tender, nondistended, bowel sounds present all four quadrants, no masses, hepatomegaly or splenomegaly. Erythema over large area lower quadrant, tender, warm, indurated with localized pointing in left to mid abdomen  Neurological - normal speech, no focal findings or movement disorder noted, cranial nerves II through XII grossly intact  Extremities - peripheral pulses palpable, no pedal edema or calf pain with palpation  Skin - rash extensively with induration as noted worse since yesterday         Data:     Labs:    Hematology:  Recent Labs     02/19/21  0515   WBC 13.7*   RBC 4.29   HGB 12.9   HCT 40.5   MCV 94.4   MCH 30.1   MCHC 31.9   RDW 12.7      MPV 10.9     Chemistry:  Recent Labs     02/19/21  0515      K 3.9      CO2 24   GLUCOSE 127*   BUN 17   CREATININE 0.72   ANIONGAP 10   LABGLOM >60   GFRAA >60   CALCIUM 8.1*     Recent Labs     02/21/21  0610   LABA1C 6.0       No results found for: INR, PROTIME    Lab Results   Component Value Date/Time    SPECIAL NOT REPORTED 02/21/2021 09:20 AM     Lab Results   Component Value Date/Time    CULTURE PENDING 02/21/2021 09:20 AM       No results found for: POCPH, PHART, PH, POCPCO2, VKM5GSU, PCO2, POCPO2, PO2ART, PO2, POCHCO3, VHD1GWG, HCO3, NBEA, PBEA, BEART, BE, THGBART, THB, UXB8KMZ, XINX1RJC, R3UDZVCM, O2SAT, FIO2    Radiology:    No results found. All radiological studies reviewed                Code Status:  Full Code    Electronically signed by Vane Camacho MD on 2/21/2021 at 9:24 PM     Copy sent to Dr. Ramírez Geiger DO    This note was created with the assistance of a speech-recognition program.  Although the intention is to generate a document that actually reflects the content of the visit, no guarantees can be provided that every mistake has been identified and corrected by editing. Note was updated later by me after  physical examination and  completion of the assessment.

## 2021-02-22 NOTE — ADT AUTH CERT
Patient Demographics    Name Patient ID SSN Gender Identity Birth Date   Charlene Awan [id number]  Female [birth year] (45 yrs)   Address Phone Email Employer    [address] [place] [address] [employer] [phone] (U)   [phone] (M) [email or web address]    [place] Race Occupation Emp Status    SHAWANDA White -- Self Employed    Reg Status PCP Date Last Verified Next Review Date    Verified -- 21    Admission Date Discharge Date Admitting Provider     21 -- Danyelle Hernandez MD     Marital Status Moravian      Single None      Emergency Contact 1   Julian Lorenz (E)   [phone] Monik Moura)   Subscriber Details  Hospital Account [de-identified]  [facility] Subscriber Name/Sex/Relation Subscriber  Subscriber Address/Phone Subscriber Emp/Emp Phone   1.  [employer]   [id number] [facility] - Female   (Self) 1975 [place], [place]  [address]   [phone](X) SELF-EMPLOYED    Utilization Reviews       Cellulitis - Care Day 3 (2021) by Tim Bush RN       Review Entered Review Status   2021 11:20 Completed      Criteria Review      Care Day: 3 Care Date: 2021 Level of Care: Inpatient Floor    Guideline Day 3    Level Of Care    ( ) Floor to discharge [E]    2021 11:20 AM EST by Glo Schroeder      inpatient    Clinical Status    (X) * Hemodynamic stability    2021 11:20 AM EST by Glo Schroeder      BP (!) 140/83   Pulse 63   Temp 98.1 °F (36.7 °C) (Oral)   Resp 14    Chest - clear to auscultation, normal effort  Heart - normal rate, regular rhythm, no murmur    (X) * Afebrile or fever improved    2021 11:20 AM EST by Maylin Blair      Temp 98.1 °F (36.7 °C) (Oral)    (X) * Skin exam stable or improved    2021 11:20 AM EST by Vita Blair      Skin - rash extensively with induration as noted worse since yesterday    (X) * Mental status at baseline    2021 11:20 AM EST by Glo Schroeder      Mental status - oriented to person, place, and time with normal affect    ( ) * Antibiotic treatment needs appropriate for next level of care    ( ) * Pain absent or manageable at next level of care    2021 11:20 AM EST by Maranda Fitzgerald      morphine (PF) injection 2 mg 2 mg, Intravenous, EVERY 4 HOURS PRN    [place] 2021 7295  [place] 2021 2303  Sun    Sun 2021 1725    Sun 2021 8987   Sun 2021 7277    ( ) * Discharge plans and education understood    Activity    (X) * Ambulatory or acceptable for next level of care    2021 11:20 AM EST by Maranda Fitzgerald      as tolerated    Routes    (X) * Oral hydration, medications, [F] and diet    2021 11:20 AM EST by Maranda Fitzgerald      General Diet    Interventions    (X) WBC    2021 11:20 AM EST by Maranda Fitzgerald      13.7    Medications    (X) Parenteral or oral antibiotics [B]    2021 11:20 AM EST by Maranda Fitzgerald      21 1700  vancomycin (VANCOCIN) 1,500 mg in dextrose 5 % 250 mL IVPB 1,500 mg, Intravenous, EVERY 8 HOURS      piperacillin-tazobactam (ZOSYN) 3,375 mg in dextrose 5 % 50 mL IVPB extended infusion (mini-bag) every 8 hours    * Milestone   Additional Notes   Family Med         Pt seen and examined at bedside   Erythema is milder   However it has extended beyond the marked line yesterday   Abscess ruled out per CT   There is induration mid left lower abdomen    Surgery have added Zosyn too already      Vitals:  BP (!) 140/83   Pulse 63   Temp 98.1 °F (36.7 °C) (Oral)   Resp 14   Ht 5' 5\" (1.651 m)   Wt (!) 334 lb (151.5 kg)   LMP 2021   SpO2 95%   BMI 55.58 kg/m²    Temp (24hrs), Av.9 °F (36.6 °C), Min:97.3 °F (36.3 °C), Max:98.2 °F (36.8 °C)        ==============   enoxaparin (LOVENOX) injection 40 mg 40 mg, Subcutaneous, 2 TIMES DAILY            Cellulitis - Care Day 2 (2021) by Junior Pardeep RN       Review Entered Review Status   2021 11:06 · Acute Dehydration    · Morbid obesity    · Nicotine dependence                Plan:       · Admit MS   · Monitor vitals closely   · Keep SpO2 above 90%   · Is/ Os   · IV fluids   · Pain control   · Morphine IV prn   · Norco PO prn   · DC Ancef   · Start Vanco   · Consult Pharmacy to dose Vancomycin   · Consult Surgery    · Joby edges of cellulitis    · Warm moist packs prn   · Nicoderm    · Up in chair   · Ambulate   · Lovenox for Px   · IS   · DVT and GI prophylaxis.             Chief Complaint:       Chief Complaint   Patient presents with   · Abscess       lower abdomen               History of Present Illness:       Lilia Walton is a 41 y. Geradine Gala who presents with Abscess (lower abdomen)       Pt presented to the [place] ED with c/o redness that extended from a small area over her left lower abdomen. Pt says she noted there was a 'boil' over there just a day ago. She says she did not even realize and the next day felt the redness spread very rapidly over the whole lower anterior abdomen which concerned her. Pt says she has also felt febrile. She felt nauseous and was diaphoretic. She denies any vomiting, headache, vision change, diarrhea. She says there is tenderness, warmth  And redness over the affected area that worsened rapidly within a day.  Pt is also found to be tachycardiac and felt thirsty and weak.       Pt denies any dyspnea, cough, orthopnea, joint swelling           General appearance - alert, well appearing, and in no acute distress   Mental status - oriented to person, place, and time with normal affect   Head - normocephalic and atraumatic   Eyes - pupils equal and reactive, extraocular eye movements intact, conjunctiva clear   Ears - hearing appears to be intact   Nose - no drainage noted   Mouth - mucous membranes moist   Neck - supple, no carotid bruits, thyroid not palpable   Chest - clear to auscultation, normal effort   Heart - normal rate, regular rhythm, no murmur   Abdomen - soft, obese, tender, nondistended, bowel sounds present all four quadrants, no masses, hepatomegaly or splenomegaly.  Erythema over large area lower quadrant, tender, warm, indurated with localized pointing in left to mid abdomen   Neurological - normal speech, no focal findings or movement disorder noted, cranial nerves II through XII grossly intact   Extremities - peripheral pulses palpable, no pedal edema or calf pain with palpation   Skin - rash extensively with induration as noted               Data:       Labs:       Hematology:   Recent Labs     02/18/21 2003 02/19/21   0515   WBC 12.5* 13.7*   RBC 4.71 4.29   HGB 14.4 12.9   HCT 43.2 40.5   MCV 91.7 94.4   MCH 30.7 30.1   MCHC 33.4 31.9   RDW 13.0 12.7    239   MPV 8.6 10.9       Chemistry:   Recent Labs     02/18/21 2003 02/19/21   0515    136   K 3.9 3.9    102   CO2 27 24   GLUCOSE 122* 127*   BUN 12 17   CREATININE 0.70 0.72   ANIONGAP 10 10   LABGLOM >60 >60   GFRAA >60 >60   CALCIUM 8.8 8.1*       No results for input(s): PROT, LABALBU, LABA1C, T7ZRELH, W3SKGTS, FT4, TSH, AST, ALT, LDH, GGT, ALKPHOS, LABGGT, BILITOT, BILIDIR, AMMONIA, AMYLASE, LIPASE, LACTATE, CHOL, HDL, LDLCHOLESTEROL, CHOLHDLRATIO, TRIG, VLDL, PPM53JW, PHENYTOIN, PHENYF, URICACID, POCGLU in the last 72 hours.       No results found for: INR, PROTIME       Lab Results   Component Value Date/Time     SPECIAL RIGHT FOREARM 20ML 02/18/2021 08:20 PM       Lab Results   Component Value Date/Time     CULTURE NO GROWTH 8 HOURS 02/18/2021 08:20 PM         ============================   2/19   [address]        Vital Signs:   Vitals:     02/19/21 1959   BP: (!) 147/86   Pulse: 67   Resp: 16   Temp: 97.3 °F (36.3 °C)   SpO2: 96%           Physical Exam:   Vital signs and Nurse's note reviewed   Gen:  A&Ox3, NAD   HEENT: NCAT, PERRLA, EOMI, no scleral icterus, oral mucosa moist   Neck: Supple, no thyroid enlargement, no cervical or supraclavicular lymphaedenopathy   Chest: Symmetric rise with inhalation, no evidence of trauma   CVS: Regular rate and rhythm   Resp: Good bilateral air entry, clear to auscultation b/l, no wheeze or rhonchi   Abd: soft, obese, anterior abdominal wall with cellulitis, no fluctuance or fluid collections noted, erythema and induration noted along pannus, tender to palpation   Ext: No clubbing, cyanosis, edema   CNS: Moves all extremities, no gross focal motor deficits       Labs:   CBC    Lab Results   Component Value Date     WBC 13.7 (H) 02/19/2021     HGB 12.9 02/19/2021     HCT 40.5 02/19/2021     MCV 94.4 02/19/2021      02/19/2021       BMP   Lab Results   Component Value Date     GLUCOSE 127 (H) 02/19/2021     CALCIUM 8.1 (L) 02/19/2021      02/19/2021     K 3.9 02/19/2021     CO2 24 02/19/2021      02/19/2021     BUN 17 02/19/2021     CREATININE 0.72 02/19/2021       PT/INR No results found for: INR, PROTIME   LFT No results found for: AST, ALT, ALKPHOS, ALBUMIN, AMYLASE, LIPASE       No results found.           Problem List:   Patient Active Problem List     Diagnosis Date Noted   · Cellulitis 02/18/2021           Impression:       Mariajose Spann is a 41 y.o. female with abdominal cellulitis/panniculitis       Recommendation:       1. Continue IV antibiotics. Ericka Elvin is no drainable fluid collection on CAT scan or exam.  Appears to be panniculitis.  No acute surgical intervention at this time. 2. Ok for heat to affected area           Electronically signed by DO Dione Damico 2/19/2021 at 9:27 PM        General Surgery Attending Attestation Note       Patient was seen and examined.  I agree with the above resident's exam, assessment and plan.        Panniculitis present   CT reviewed   No drainable abscess   Continue IV abx, add Zosyn       Will check HA1C    Pt has not had a PCP in quite some time   ========================   0.9 % sodium chloride infusion    Rate: 100 mL/hr Freq: CONTINUOUS Route: IV   Start: 02/19/21 0315 End: 02/21/21 2129      enoxaparin (LOVENOX) injection 40 mg    Dose: 40 mg   Freq: 2 TIMES DAILY Route: SC   Start: 02/19/21 2100      enoxaparin (LOVENOX) injection 30 mg    Dose: 30 mg   Freq: 2 TIMES DAILY Route: SC   Start: 02/19/21 0900 End: 02/19/21 1438

## 2021-02-23 LAB
ABSOLUTE EOS #: 0.11 K/UL (ref 0–0.44)
ABSOLUTE IMMATURE GRANULOCYTE: 0.02 K/UL (ref 0–0.3)
ABSOLUTE LYMPH #: 1.62 K/UL (ref 1.1–3.7)
ABSOLUTE MONO #: 0.46 K/UL (ref 0.1–1.2)
BASOPHILS # BLD: 1 % (ref 0–2)
BASOPHILS ABSOLUTE: 0.04 K/UL (ref 0–0.2)
BUN BLDV-MCNC: 9 MG/DL (ref 6–20)
CREAT SERPL-MCNC: 0.63 MG/DL (ref 0.5–0.9)
DIFFERENTIAL TYPE: ABNORMAL
EOSINOPHILS RELATIVE PERCENT: 1 % (ref 1–4)
GFR AFRICAN AMERICAN: >60 ML/MIN
GFR NON-AFRICAN AMERICAN: >60 ML/MIN
GFR SERPL CREATININE-BSD FRML MDRD: NORMAL ML/MIN/{1.73_M2}
GFR SERPL CREATININE-BSD FRML MDRD: NORMAL ML/MIN/{1.73_M2}
HCT VFR BLD CALC: 39.9 % (ref 36.3–47.1)
HEMOGLOBIN: 12.7 G/DL (ref 11.9–15.1)
IMMATURE GRANULOCYTES: 0 %
LYMPHOCYTES # BLD: 21 % (ref 24–43)
MCH RBC QN AUTO: 29.8 PG (ref 25.2–33.5)
MCHC RBC AUTO-ENTMCNC: 31.8 G/DL (ref 28.4–34.8)
MCV RBC AUTO: 93.7 FL (ref 82.6–102.9)
MONOCYTES # BLD: 6 % (ref 3–12)
NRBC AUTOMATED: 0 PER 100 WBC
PDW BLD-RTO: 12.3 % (ref 11.8–14.4)
PLATELET # BLD: 277 K/UL (ref 138–453)
PLATELET ESTIMATE: ABNORMAL
PMV BLD AUTO: 10.1 FL (ref 8.1–13.5)
RBC # BLD: 4.26 M/UL (ref 3.95–5.11)
RBC # BLD: ABNORMAL 10*6/UL
SEG NEUTROPHILS: 71 % (ref 36–65)
SEGMENTED NEUTROPHILS ABSOLUTE COUNT: 5.47 K/UL (ref 1.5–8.1)
VANCOMYCIN TROUGH DATE LAST DOSE: NORMAL
VANCOMYCIN TROUGH DOSE AMOUNT: NORMAL
VANCOMYCIN TROUGH TIME LAST DOSE: NORMAL
VANCOMYCIN TROUGH: 15.1 UG/ML (ref 10–20)
WBC # BLD: 7.7 K/UL (ref 3.5–11.3)
WBC # BLD: ABNORMAL 10*3/UL

## 2021-02-23 PROCEDURE — 6360000002 HC RX W HCPCS: Performed by: STUDENT IN AN ORGANIZED HEALTH CARE EDUCATION/TRAINING PROGRAM

## 2021-02-23 PROCEDURE — 80202 ASSAY OF VANCOMYCIN: CPT

## 2021-02-23 PROCEDURE — 6360000002 HC RX W HCPCS: Performed by: HOSPITALIST

## 2021-02-23 PROCEDURE — 6370000000 HC RX 637 (ALT 250 FOR IP): Performed by: FAMILY MEDICINE

## 2021-02-23 PROCEDURE — 84520 ASSAY OF UREA NITROGEN: CPT

## 2021-02-23 PROCEDURE — 1200000000 HC SEMI PRIVATE

## 2021-02-23 PROCEDURE — 85025 COMPLETE CBC W/AUTO DIFF WBC: CPT

## 2021-02-23 PROCEDURE — 2580000003 HC RX 258: Performed by: STUDENT IN AN ORGANIZED HEALTH CARE EDUCATION/TRAINING PROGRAM

## 2021-02-23 PROCEDURE — 2580000003 HC RX 258: Performed by: FAMILY MEDICINE

## 2021-02-23 PROCEDURE — 2580000003 HC RX 258: Performed by: SURGERY

## 2021-02-23 PROCEDURE — 36415 COLL VENOUS BLD VENIPUNCTURE: CPT

## 2021-02-23 PROCEDURE — 99232 SBSQ HOSP IP/OBS MODERATE 35: CPT | Performed by: INTERNAL MEDICINE

## 2021-02-23 PROCEDURE — 82565 ASSAY OF CREATININE: CPT

## 2021-02-23 PROCEDURE — 6360000002 HC RX W HCPCS: Performed by: FAMILY MEDICINE

## 2021-02-23 RX ORDER — POTASSIUM CHLORIDE 7.45 MG/ML
10 INJECTION INTRAVENOUS PRN
Status: DISCONTINUED | OUTPATIENT
Start: 2021-02-23 | End: 2021-02-26 | Stop reason: HOSPADM

## 2021-02-23 RX ORDER — MAGNESIUM SULFATE 1 G/100ML
1000 INJECTION INTRAVENOUS PRN
Status: DISCONTINUED | OUTPATIENT
Start: 2021-02-23 | End: 2021-02-26 | Stop reason: HOSPADM

## 2021-02-23 RX ORDER — MORPHINE SULFATE 2 MG/ML
2 INJECTION, SOLUTION INTRAMUSCULAR; INTRAVENOUS EVERY 6 HOURS PRN
Status: DISCONTINUED | OUTPATIENT
Start: 2021-02-23 | End: 2021-02-24

## 2021-02-23 RX ORDER — POTASSIUM CHLORIDE 20 MEQ/1
40 TABLET, EXTENDED RELEASE ORAL PRN
Status: DISCONTINUED | OUTPATIENT
Start: 2021-02-23 | End: 2021-02-26 | Stop reason: HOSPADM

## 2021-02-23 RX ADMIN — ENOXAPARIN SODIUM 40 MG: 100 INJECTION SUBCUTANEOUS at 21:28

## 2021-02-23 RX ADMIN — PIPERACILLIN AND TAZOBACTAM 3375 MG: 3; .375 INJECTION, POWDER, LYOPHILIZED, FOR SOLUTION INTRAVENOUS at 23:40

## 2021-02-23 RX ADMIN — MORPHINE SULFATE 2 MG: 2 INJECTION, SOLUTION INTRAMUSCULAR; INTRAVENOUS at 12:11

## 2021-02-23 RX ADMIN — OXYCODONE AND ACETAMINOPHEN 1 TABLET: 5; 325 TABLET ORAL at 09:23

## 2021-02-23 RX ADMIN — PIPERACILLIN AND TAZOBACTAM 3375 MG: 3; .375 INJECTION, POWDER, LYOPHILIZED, FOR SOLUTION INTRAVENOUS at 06:27

## 2021-02-23 RX ADMIN — MORPHINE SULFATE 2 MG: 2 INJECTION, SOLUTION INTRAMUSCULAR; INTRAVENOUS at 18:23

## 2021-02-23 RX ADMIN — VANCOMYCIN HYDROCHLORIDE 1500 MG: 5 INJECTION, POWDER, LYOPHILIZED, FOR SOLUTION INTRAVENOUS at 19:57

## 2021-02-23 RX ADMIN — OXYCODONE AND ACETAMINOPHEN 1 TABLET: 5; 325 TABLET ORAL at 14:22

## 2021-02-23 RX ADMIN — OXYCODONE AND ACETAMINOPHEN 1 TABLET: 5; 325 TABLET ORAL at 20:28

## 2021-02-23 RX ADMIN — PIPERACILLIN AND TAZOBACTAM 3375 MG: 3; .375 INJECTION, POWDER, LYOPHILIZED, FOR SOLUTION INTRAVENOUS at 15:23

## 2021-02-23 RX ADMIN — VANCOMYCIN HYDROCHLORIDE 1500 MG: 5 INJECTION, POWDER, LYOPHILIZED, FOR SOLUTION INTRAVENOUS at 03:00

## 2021-02-23 RX ADMIN — VANCOMYCIN HYDROCHLORIDE 1500 MG: 5 INJECTION, POWDER, LYOPHILIZED, FOR SOLUTION INTRAVENOUS at 12:01

## 2021-02-23 RX ADMIN — ENOXAPARIN SODIUM 40 MG: 100 INJECTION SUBCUTANEOUS at 09:22

## 2021-02-23 RX ADMIN — OXYCODONE AND ACETAMINOPHEN 1 TABLET: 5; 325 TABLET ORAL at 04:48

## 2021-02-23 RX ADMIN — OXYCODONE AND ACETAMINOPHEN 1 TABLET: 5; 325 TABLET ORAL at 00:47

## 2021-02-23 RX ADMIN — MORPHINE SULFATE 2 MG: 2 INJECTION, SOLUTION INTRAMUSCULAR; INTRAVENOUS at 06:16

## 2021-02-23 RX ADMIN — SODIUM CHLORIDE, PRESERVATIVE FREE 10 ML: 5 INJECTION INTRAVENOUS at 21:28

## 2021-02-23 ASSESSMENT — PAIN SCALES - GENERAL
PAINLEVEL_OUTOF10: 7
PAINLEVEL_OUTOF10: 6

## 2021-02-23 ASSESSMENT — PAIN DESCRIPTION - PAIN TYPE: TYPE: ACUTE PAIN

## 2021-02-23 ASSESSMENT — PAIN DESCRIPTION - ORIENTATION
ORIENTATION: LOWER

## 2021-02-23 ASSESSMENT — PAIN DESCRIPTION - DESCRIPTORS: DESCRIPTORS: DISCOMFORT

## 2021-02-23 ASSESSMENT — ENCOUNTER SYMPTOMS
ABDOMINAL PAIN: 1
COLOR CHANGE: 1
RESPIRATORY NEGATIVE: 1

## 2021-02-23 ASSESSMENT — PAIN DESCRIPTION - ONSET: ONSET: ON-GOING

## 2021-02-23 ASSESSMENT — PAIN - FUNCTIONAL ASSESSMENT: PAIN_FUNCTIONAL_ASSESSMENT: ACTIVITIES ARE NOT PREVENTED

## 2021-02-23 ASSESSMENT — PAIN DESCRIPTION - FREQUENCY: FREQUENCY: CONTINUOUS

## 2021-02-23 ASSESSMENT — PAIN DESCRIPTION - LOCATION: LOCATION: ABDOMEN

## 2021-02-23 NOTE — PROGRESS NOTES
[facility]  Progress Note    Augie Rowley  MRN: [id number]  Date: 2/23/2021  LOS: 5     Reason for F/U :   Abdominal wall cellulitis    Impression :   1. Anterior abdominal wall/mons pubis area abscess with associated cellulitis  2. Morbid obesity    Recommendations:   · The cellulitic changes are overall stable may be slightly improved. · Continue intravenous antimicrobial therapy with Zosyn and vancomycin for now  · We will continue to monitor her clinical progress. · The pain control remains an issue at this point in time. Infection Control Recommendations:   Universal precautions    Discharge Planning:   Estimated Length of IV antimicrobials: To be determined  Patient will need Midline Catheter Insertion/ PICC line Insertion: No  Patient will need: Home IV , [place],  SNF,  LTAC: Undetermined  Patient willneed outpatient wound care: No    Medical Decision making / Summary of Stay:   Augie Rowley is a 41y.o.-year-old female who was initially admitted on 2/18/2021. Lilia was in her usual state of health until Wednesday when she reports a bump in the mons pubis area that was very hard, red and swollen. By Thursday she reports that it was enlarging and became like baseball sized by noon. The patient then reports around 5/6 PM it had doubled in size and she was having some associated fevers and chills. She does not report any prior soft tissue infections especially not with MRSA. She ended up going to the emergency department as she was developing cellulitis of the lower abdomen and the patient had blood cultures done started on IV antimicrobial therapy and was transferred to [facility] for admission.     The patient was seen by the general surgery team and a CAT scan of the abdomen pelvis had been done showing no drainable fluid collection. The patient had initially been started on vancomycin and Zosyn was added.   The patient's abdominal wall cellulitic changes had persisted and I was asked to evaluate and help with antibiotic choice. Current evaluation:2021    BP (!) 148/74   Pulse 72   Temp 97.7 °F (36.5 °C) (Oral)   Resp 15   Ht 5' 5\" (1.651 m)   Wt (!) 334 lb (151.5 kg)   LMP 2021   SpO2 92%   BMI 55.58 kg/m²     Temperature Range: Temp: 97.7 °F (36.5 °C) Temp  Av °F (36.7 °C)  Min: 97.7 °F (36.5 °C)  Max: 98.4 °F (36.9 °C)  The patient is seen and evaluated at bedside she is awake and alert in no acute distress. Continues to have abdominal pain and her pain medications were switched to Percocet. He does not report any subjective fever or chills    Review of Systems   Constitutional: Negative. Respiratory: Negative. Cardiovascular: Negative. Gastrointestinal: Positive for abdominal pain. Genitourinary: Negative. Musculoskeletal: Negative. Skin: Positive for color change and rash. Neurological: Negative. Psychiatric/Behavioral: Negative. Physical Examination :     Physical Exam  Constitutional:       Appearance: She is well-developed. She is obese. HENT:      Head: Normocephalic and atraumatic. Neck:      Musculoskeletal: Normal range of motion and neck supple. Cardiovascular:      Rate and Rhythm: Regular rhythm. Heart sounds: Normal heart sounds. Pulmonary:      Effort: Pulmonary effort is normal.      Breath sounds: Normal breath sounds. Abdominal:      General: Bowel sounds are normal.      Palpations: Abdomen is soft. Comments: There are some soft tissue induration in the mons pubis area where she has a wound with packing. The anterior abdominal wall cellulitis is overall improved   Skin:     General: Skin is warm and dry. Neurological:      Mental Status: She is alert and oriented to person, place, and time.          Laboratory data:   I have independently reviewed the followinglabs:  CBC with Differential:   Recent Labs     21  0615   WBC 7.7   HGB 12.7   HCT 39.9   PLT 277   LYMPHOPCT 21*   MONOPCT 6     BMP:   Recent Labs     02/23/21  0615   BUN 9   CREATININE 0.63     Hepatic Function Panel: No results for input(s): PROT, LABALBU, BILIDIR, IBILI, BILITOT, ALKPHOS, ALT, AST in the last 72 hours. No results found for: PROCAL  No results found for: CRP  No results found for: SEDRATE      No results found for: DDIMER  No results found for: FERRITIN  No results found for: LDH  No results found for: FIBRINOGEN    No results found for requested labs within last 30 days. No results found for: Oswaldo Monter[place] 20     02/20/21  1552 02/23/21  0951   VANCOTROUGH 11.6 15.1       Imaging Studies:   No new imaging    Cultures:     Culture, Blood 1 [[id number]] Collected: 02/18/21 2020   Order Status: Completed Specimen: Blood Updated: 02/23/21 0234    Specimen Description . BLOOD    Special Requests RIGHT FOREARM 20ML    Culture NO GROWTH 5 DAYS   Culture, Blood 1 [[id number]] Collected: 02/18/21 2010   Order Status: Completed Specimen: Blood Updated: 02/23/21 0234    Specimen Description . BLOOD    Special Requests RIGHT ANTICUBITAL 20ML    Culture NO GROWTH 5 DAYS   Culture, Anaerobic and Aerobic [[id number]] (Abnormal) Collected: 02/21/21 0920   Order Status: Completed Specimen: Abscess Updated: 02/22/21 1750    Specimen Description . ABSCESS    Special Requests NOT REPORTED    Direct Exam RARE NEUTROPHILSAbnormal      NO BACTERIA SEEN    Culture NO GROWTH 1 DAY         Medications:      piperacillin-tazobactam  3,375 mg Intravenous Q8H    vancomycin  1,500 mg Intravenous Q8H    enoxaparin  40 mg Subcutaneous BID    nicotine  1 patch Transdermal Daily    vancomycin (VANCOCIN) intermittent dosing (placeholder)   Other RX Placeholder           [facility]  Lianna Espinoza  [employer] messaging  OFFICE: [phone]      Electronically signed by Lianna Espinoza MD on 2/23/2021 at 11:31 AM  Thank you for allowing us to participate in the care of this patient. Please call with questions. This note iscreated with the assistance of a speech recognition program.  While intending to generate a document that actually reflects the content of the visit, the document can still have some errors including those of syntax andsound a like substitutions which may escape proof reading. In such instances, actual meaning can be extrapolated by contextual diversion.

## 2021-02-23 NOTE — PROGRESS NOTES
[facility].,    Adult Hospitalist      Name: Bria Lenz  MRN: [id number]     [place]: [de-identified]  Room: 2006/2006-02    Admit Date: 2/18/2021  7:45 PM  PCP: No primary care provider on file. Primary Problem  Active Problems:    Cellulitis  Resolved Problems:    * No resolved hospital problems. *        Assesment:     · Cellulitis, lower abdominal wall  · Panniculitis - worsening   · Bacteremia   · Question MRSA  · Question abscess - ruled out   · Tachycardia  · Acute Dehydration - resolved  · Morbid obesity   · Nicotine dependence         Plan:     · Admit MS  · Monitor vitals closely  · Keep SpO2 above 90%  · Is/ Os  · IV fluids  · Pain control  · Morphine IV prn  · Norco PO prn  · DC Ancef  · Start Vanco  · Consult Pharmacy to dose Vancomycin  · Consult Surgery   · Joby edges of cellulitis   · Warm moist packs prn  · Nicoderm   · Up in chair  · Ambulate  · Lovenox for Px  · IS  · DVT and GI prophylaxis. · HbA1C  · Zosyn IV added  · S/p I&D 2/21/21  · Consult ID - appreciate input        Chief Complaint:     Chief Complaint   Patient presents with    Abscess     lower abdomen         History of Present Illness:        Pt seen and examined at bedside  Panniculitis worsening  No sig dx from incision  Cx taken  Up in chair  Ambulating  Wt mgmt in future  Dehydration resolved  DC IVF  Eating and drinking OK      Initial HPI  Bria Lenz is a 41 y.o.  female who presents with Abscess (lower abdomen)    Pt presented to the [facility] ED with c/o redness that extended from a small area over her left lower abdomen. Pt says she noted there was a 'boil' over there just a day ago. She says she did not even realize and the next day felt the redness spread very rapidly over the whole lower anterior abdomen which concerned her. Pt says she has also felt febrile. She felt nauseous and was diaphoretic. She denies any vomiting, headache, vision change, diarrhea.  She says there is tenderness, warmth  And redness over the affected area that worsened rapidly within a day. Pt is also found to be tachycardiac and felt thirsty and weak. Pt denies any dyspnea, cough, orthopnea, joint swelling    I have personally reviewed the past medical history, past surgical history, medications, social history, and family history, and summarized in the note. Review of Systems:     All 10 point system is reviewed and negative otherwise mentioned in HPI. Past Medical History:     Past Medical History:   Diagnosis Date    PID (acute pelvic inflammatory disease)         Past Surgical History:     Past Surgical History:   Procedure Laterality Date    CERVICAL FUSION      CERVICAL SPINE SURGERY          Medications Prior to Admission:       Prior to Admission medications    Not on File        Allergies:       Patient has no known allergies. Social History:     Tobacco:    reports that she has been smoking cigarettes. She has been smoking about 1.00 pack per day. She has never used smokeless tobacco.  Alcohol:      reports current alcohol use. Drug Use:  reports current drug use. Frequency: 1.00 time per week. Drug: Marijuana. Family History:     History reviewed. No pertinent family history.       Physical Exam:     Vitals:  /70   Pulse 70   Temp 98.4 °F (36.9 °C) (Oral)   Resp 16   Ht 5' 5\" (1.651 m)   Wt (!) 334 lb (151.5 kg)   LMP 2021   SpO2 95%   BMI 55.58 kg/m²   Temp (24hrs), Av.2 °F (36.8 °C), Min:97.9 °F (36.6 °C), Max:98.6 °F (37 °C)          General appearance - alert, well appearing, and in no acute distress  Mental status - oriented to person, place, and time with normal affect  Head - normocephalic and atraumatic  Eyes - pupils equal and reactive, extraocular eye movements intact, conjunctiva clear  Ears - hearing appears to be intact  Nose - no drainage noted  Mouth - mucous membranes moist  Neck - supple, no carotid bruits, thyroid not palpable  Chest - clear to auscultation, normal effort  Heart - normal rate, regular rhythm, no murmur  Abdomen - soft, obese, tender, nondistended, bowel sounds present all four quadrants, no masses, hepatomegaly or splenomegaly. Erythema over large area lower quadrant, tender, warm, indurated with localized pointing in left to mid abdomen  Neurological - normal speech, no focal findings or movement disorder noted, cranial nerves II through XII grossly intact  Extremities - peripheral pulses palpable, no pedal edema or calf pain with palpation  Skin - rash extensively with induration as noted worse since yesterday         Data:     Labs:    Hematology:  No results for input(s): WBC, RBC, HGB, HCT, MCV, MCH, MCHC, RDW, PLT, MPV, SEDRATE, CRP, INR, DDIMER, VC2PMXYN, LABABSO in the last 72 hours. Invalid input(s): PT  Chemistry:  No results for input(s): NA, K, CL, CO2, GLUCOSE, BUN, CREATININE, MG, ANIONGAP, LABGLOM, GFRAA, CALCIUM, CAION, PHOS, PSA, PROBNP, TROPHS, CKTOTAL, CKMB, CKMBINDEX, MYOGLOBIN, DIGOXIN, LACTACIDWB in the last 72 hours. Recent Labs     02/21/21  0610   LABA1C 6.0       No results found for: INR, PROTIME    Lab Results   Component Value Date/Time    SPECIAL NOT REPORTED 02/21/2021 09:20 AM     Lab Results   Component Value Date/Time    CULTURE NO GROWTH 1 DAY 02/21/2021 09:20 AM       No results found for: POCPH, PHART, PH, POCPCO2, HSX5PRT, PCO2, POCPO2, PO2ART, PO2, POCHCO3, GSX8JDB, HCO3, NBEA, PBEA, BEART, BE, THGBART, THB, TXN7ZVJ, SZGK8IGS, Y9ICWPDO, O2SAT, FIO2    Radiology:    No results found. All radiological studies reviewed                Code Status:  Full Code    Electronically signed by Alodnra Rey MD on 2/22/2021 at 7:49 PM     Copy sent to Dr. Vega primary care provider on file.     This note was created with the assistance of a speech-recognition program.  Although the intention is to generate a document that actually reflects the content of the visit, no guarantees can be provided that every mistake has been

## 2021-02-23 NOTE — PLAN OF CARE
Problem:  Body Temperature - Imbalanced:  Goal: Ability to maintain a body temperature in the normal range will improve  Description: Ability to maintain a body temperature in the normal range will improve  2/23/2021 1225 by Rafi Kerr RN  Note: Monitoring labs, Vitals  and assessing wound each shift  2/23/2021 0110 by Waylon Villaseñor RN  Outcome: Ongoing

## 2021-02-23 NOTE — PROGRESS NOTES
General Surgery:  Daily Progress Note                 PATIENT NAME: Malena Lazo     TODAY'S DATE: 2/23/2021, 5:45 AM    SUBJECTIVE:     Pt seen and examined at bedside. No acute events overnight. Afebrile. Pt states overall pain is about the same as yesterday. Claims erythema area is not improving still. Denies fever, chills, nausea, vomiting, chest pain, and SOB. OBJECTIVE:   VITALS:  /70   Pulse 70   Temp 98.4 °F (36.9 °C) (Oral)   Resp 16   Ht 5' 5\" (1.651 m)   Wt (!) 334 lb (151.5 kg)   LMP 02/01/2021   SpO2 95%   BMI 55.58 kg/m²      INTAKE/OUTPUT:      Intake/Output Summary (Last 24 hours) at 2/23/2021 0545  Last data filed at 2/22/2021 1828  Gross per 24 hour   Intake 950 ml   Output --   Net 950 ml       PHYSICAL EXAM:  General Appearance:  awake, alert, oriented, in no acute distress  Lungs:  Normal expansion. No inc WOB  Heart:  Heart regular rate and rhythm  Abdomen:  soft, ND, tender in lower abdomen near induration, erythema at previous markings, no blisters or boils. I&D site clean and dry. Data:  CBC with Differential:    Lab Results   Component Value Date    WBC 13.7 02/19/2021    RBC 4.29 02/19/2021    HGB 12.9 02/19/2021    HCT 40.5 02/19/2021     02/19/2021    MCV 94.4 02/19/2021    MCH 30.1 02/19/2021    MCHC 31.9 02/19/2021    RDW 12.7 02/19/2021    LYMPHOPCT 16 02/19/2021    MONOPCT 6 02/19/2021    BASOPCT 0 02/19/2021    MONOSABS 0.86 02/19/2021    LYMPHSABS 2.14 02/19/2021    EOSABS 0.04 02/19/2021    BASOSABS 0.05 02/19/2021    DIFFTYPE NOT REPORTED 02/19/2021     BMP:    Lab Results   Component Value Date     02/19/2021    K 3.9 02/19/2021     02/19/2021    CO2 24 02/19/2021    BUN 17 02/19/2021    CREATININE 0.72 02/19/2021    CALCIUM 8.1 02/19/2021    GFRAA >60 02/19/2021    LABGLOM >60 02/19/2021    GLUCOSE 127 02/19/2021       ASSESSMENT:  Active Hospital Problems    Diagnosis Date Noted    Cellulitis [L03.90] 02/18/2021       45 y. o. female with panniculitis    Plan:  1. Medical mgt per primary  2. Ok for diet  3. Daily packing changes   4. Pain control  5. Continue antibiotics, ID following along  6. F/u AM cbc    Electronically signed by Rj Cantu DO  on 2/23/2021 at 5:45 AM     Attending Physician Statement  I have discussed the case, including pertinent history and exam findings with the resident. I agree with the assessment, plan and orders as documented by the resident. Patient seen and examined. Agree with above. Significant abdominal wall cellulitis with no significant clinical change. ID input appreciated.   May re-scan her abdomen if no improvement in the next 24 hours to evaluated for drainable fluid collection

## 2021-02-23 NOTE — PROGRESS NOTES
[address] Hospitalist        2/22/2021   10:14 PM    Name:  Maryse Laboy  MRN:    [id number]     Acct:     [de-identified]   Room:  2006/2006-02  IP Day: 4     Admit Date: 2/18/2021  7:45 PM  PCP: No primary care provider on file. C/C:   Chief Complaint   Patient presents with    Abscess     lower abdomen       Assessment:      · Lower abdominal wall cellulitis/panniculitis  · Sepsis  · Concern for MRSA  · Dehydration   · Tobacco use      Plan:        · Patient currently on MedSurg with telemetry  · O2 maintain oxygen saturation greater than 92%  · Blood culture  · CT abdomen and pelvis for evaluation of cellulitis/panniculitis  · Antibiotics as below  · ID on board  · DVT and GI prophylaxis  · Continue to monitor/telemetry/CBC with differential daily/BMP daily  · Continue medications as below    Scheduled Meds:   piperacillin-tazobactam  3,375 mg Intravenous Q8H    vancomycin  1,500 mg Intravenous Q8H    enoxaparin  40 mg Subcutaneous BID    nicotine  1 patch Transdermal Daily    vancomycin (VANCOCIN) intermittent dosing (placeholder)   Other RX Placeholder     Continuous Infusions:    PRN Meds:      oxyCODONE-acetaminophen, 1 tablet, Q4H PRN      diphenhydrAMINE, 25 mg, Q8H PRN      senna, 1 tablet, BID PRN      docusate sodium, 100 mg, BID PRN      morphine, 2 mg, Q4H PRN      metoprolol, 5 mg, Q6H PRN      acetaminophen, 650 mg, Q4H PRN      sodium chloride flush, 10 mL, PRN            Subjective:     Patient seen and examined at bedside. Complaining of pain in lower abdominal area  Afebrile  Pt. Denies any CP, SOB, palpitation, HA, dizziness, chills, cough, cold, changes in urination, BM or skin changes. ROS:  A 10 point system reviewed and negative otherwise mentioned above.         Physical Examination:      Vitals:  /70   Pulse 70   Temp 98.4 °F (36.9 °C) (Oral)   Resp 16   Ht 5' 5\" (1.651 m)   Wt (!) 334 lb (151.5 kg)   LMP 02/01/2021   SpO2 95% BMI 55.58 kg/m²   Temp (24hrs), Av.2 °F (36.8 °C), Min:97.9 °F (36.6 °C), Max:98.6 °F (37 °C)    Weight:   Wt Readings from Last 3 Encounters:   21 (!) 334 lb (151.5 kg)   20 (!) 305 lb (138.3 kg)     I/O last 3 completed shifts:  I/O last 3 completed shifts: In: 2100.6 [P.O.:360; I.V.:1200; IV Piggyback:540.6]  Out: -      No results for input(s): POCGLU in the last 72 hours. General appearance - alert, obese, and in no acute distress  Mental status - oriented to person, place, and time with normal affect  Head - normocephalic and atraumatic  Eyes - pupils equal and reactive, extraocular eye movements intact, conjunctiva clear  Ears - hearing appears to be intact  Nose - no drainage noted  Mouth - mucous membranes moist  Neck - supple, no carotid bruits, thyroid not palpable  Chest - clear to auscultation, normal effort  Heart - normal rate, regular rhythm, no murmur  Abdomen - soft, nontender, nondistended, bowel sounds present all four quadrants, no masses, hepatomegaly or splenomegaly  Neurological - normal speech, no focal findings or movement disorder noted, cranial nerves II through XII grossly intact  Extremities - peripheral pulses palpable, no pedal edema or calf pain with palpation  Skin -lower abdominal induration, erythema, tenderness        Medications:      Allergies: No Known Allergies    Current Meds:   Current Facility-Administered Medications:     oxyCODONE-acetaminophen (PERCOCET) 5-325 MG per tablet 1 tablet, 1 tablet, Oral, Q4H PRN, Emilee Olson MD, 1 tablet at 21    [COMPLETED] piperacillin-tazobactam (ZOSYN) 4,500 mg in dextrose 5 % 100 mL IVPB (mini-bag), 4,500 mg, Intravenous, Once, Stopped at 21 0259 **AND** piperacillin-tazobactam (ZOSYN) 3,375 mg in dextrose 5 % 50 mL IVPB extended infusion (mini-bag), 3,375 mg, Intravenous, Q8H, Chaparro Vaca DO, Stopped at 21 1840    vancomycin (VANCOCIN) 1,500 mg in dextrose 5 % 250 mL IVPB, 1,500 mg, CULTURE NO GROWTH 1 DAY 02/21/2021 09:20 AM       No results found for: POCPH, PHART, PH, POCPCO2, IWK1EBF, PCO2, POCPO2, PO2ART, PO2, POCHCO3, ZHK0NWZ, HCO3, NBEA, PBEA, BEART, BE, THGBART, THB, SDP5SYK, KLQA5PHB, [id number], O2SAT, FIO2    Radiology:    Ct Abdomen Pelvis W Iv Contrast Additional Contrast? Oral    Result Date: 2/19/2021  There is mild cutaneous/subcutaneus soft tissue infiltration involving the lower anterior abdominal and pelvic wall. Findings are consistent with reported cellulitis. However no abscess collection is noted. No other acute abnormality within the abdomen and pelvis. All radiological studies reviewed  Code Status:  Full Code        Electronically signed by Vinny Marinelli MD on 2/22/2021 at 10:14 PM    This note was created with the assistance of a speech-recognition program.  Although the intention is to generate a document that actually reflects the content of the visit, no guarantees can be provided that every mistake has been identified and corrected by editing. Note was updated later by me after  physical examination and  completion of the assessment.

## 2021-02-23 NOTE — PLAN OF CARE
Problem: Discharge Planning:  Goal: Discharged to appropriate level of care  Description: Discharged to appropriate level of care  Outcome: Ongoing     Problem:  Body Temperature - Imbalanced:  Goal: Ability to maintain a body temperature in the normal range will improve  Description: Ability to maintain a body temperature in the normal range will improve  Outcome: Ongoing     Problem: Mobility - Impaired:  Goal: Mobility will improve to maximum level  Description: Mobility will improve to maximum level  Outcome: Ongoing     Problem: Pain:  Goal: Pain level will decrease  Description: Pain level will decrease  Outcome: Ongoing  Goal: Control of acute pain  Description: Control of acute pain  Outcome: Ongoing  Goal: Control of chronic pain  Description: Control of chronic pain  Outcome: Ongoing     Problem: Skin Integrity - Impaired:  Goal: Will show no infection signs and symptoms  Description: Will show no infection signs and symptoms  2/23/2021 0110 by Priyank James RN  Outcome: Ongoing  2/22/2021 1352 by Deena Baer RN  Note: Monitoring labs, vitals, ID and surgical consult, IV atb  Goal: Absence of new skin breakdown  Description: Absence of new skin breakdown  Outcome: Ongoing

## 2021-02-23 NOTE — PROGRESS NOTES
Pharmacy Note  Vancomycin Consult - Follow Up     Vancomycin Therapy Day: 5  Current Dosing: Vancomycin 1500mg IV q 8 hours  Current diagnosis for which MRSA is suspected/confirmed: abdominal abscess  ONLY for suspected pneumonia or COPD: MRSA nasal swab   N/A: Non respiratory infection. .      Temp: 97.7    Actual Weight:   Wt Readings from Last 1 Encounters:   02/19/21 (!) 334 lb (151.5 kg)       Recent Labs     02/23/21  0615   CREATININE 0.63     Calculate CrCl based on IBW: > 90 ml/min    Recent Labs     02/23/21  0615   WBC 7.7         Intake/Output Summary (Last 24 hours) at 2/23/2021 1144  Last data filed at 2/23/2021 1001  Gross per 24 hour   Intake 950 ml   Output --   Net 950 ml           ASSESSMENT/PLAN    Vancomycin trough drawn this AM = 15.1 ug/ml. This is within our goal of 15 - 20 ug/ml. Continue current dosing of vancomycin 1500mg IV q 8 hours. Renal function appears stable. Thank you for the consult. Will Continue to follow.   Susy Knott RPh  2/23/2021  11:44 AM

## 2021-02-24 ENCOUNTER — APPOINTMENT (OUTPATIENT)
Dept: CT IMAGING | Age: 46
DRG: 710 | End: 2021-02-24
Payer: MEDICARE

## 2021-02-24 LAB
ANION GAP SERPL CALCULATED.3IONS-SCNC: 7 MMOL/L (ref 9–17)
BUN BLDV-MCNC: 10 MG/DL (ref 6–20)
BUN/CREAT BLD: 14 (ref 9–20)
CALCIUM SERPL-MCNC: 8.3 MG/DL (ref 8.6–10.4)
CHLORIDE BLD-SCNC: 102 MMOL/L (ref 98–107)
CO2: 27 MMOL/L (ref 20–31)
CREAT SERPL-MCNC: 0.73 MG/DL (ref 0.5–0.9)
CULTURE: NORMAL
CULTURE: NORMAL
GFR AFRICAN AMERICAN: >60 ML/MIN
GFR NON-AFRICAN AMERICAN: >60 ML/MIN
GFR SERPL CREATININE-BSD FRML MDRD: ABNORMAL ML/MIN/{1.73_M2}
GFR SERPL CREATININE-BSD FRML MDRD: ABNORMAL ML/MIN/{1.73_M2}
GLUCOSE BLD-MCNC: 205 MG/DL (ref 70–99)
Lab: NORMAL
Lab: NORMAL
MAGNESIUM: 1.7 MG/DL (ref 1.6–2.6)
POTASSIUM SERPL-SCNC: 3.8 MMOL/L (ref 3.7–5.3)
SODIUM BLD-SCNC: 136 MMOL/L (ref 135–144)
SPECIMEN DESCRIPTION: NORMAL
SPECIMEN DESCRIPTION: NORMAL

## 2021-02-24 PROCEDURE — 6360000004 HC RX CONTRAST MEDICATION: Performed by: STUDENT IN AN ORGANIZED HEALTH CARE EDUCATION/TRAINING PROGRAM

## 2021-02-24 PROCEDURE — 6370000000 HC RX 637 (ALT 250 FOR IP): Performed by: HOSPITALIST

## 2021-02-24 PROCEDURE — 80048 BASIC METABOLIC PNL TOTAL CA: CPT

## 2021-02-24 PROCEDURE — 74177 CT ABD & PELVIS W/CONTRAST: CPT

## 2021-02-24 PROCEDURE — 1200000000 HC SEMI PRIVATE

## 2021-02-24 PROCEDURE — 6360000002 HC RX W HCPCS: Performed by: STUDENT IN AN ORGANIZED HEALTH CARE EDUCATION/TRAINING PROGRAM

## 2021-02-24 PROCEDURE — 36415 COLL VENOUS BLD VENIPUNCTURE: CPT

## 2021-02-24 PROCEDURE — 6370000000 HC RX 637 (ALT 250 FOR IP): Performed by: FAMILY MEDICINE

## 2021-02-24 PROCEDURE — 6370000000 HC RX 637 (ALT 250 FOR IP): Performed by: INTERNAL MEDICINE

## 2021-02-24 PROCEDURE — 83735 ASSAY OF MAGNESIUM: CPT

## 2021-02-24 PROCEDURE — 2580000003 HC RX 258: Performed by: STUDENT IN AN ORGANIZED HEALTH CARE EDUCATION/TRAINING PROGRAM

## 2021-02-24 PROCEDURE — 2580000003 HC RX 258: Performed by: FAMILY MEDICINE

## 2021-02-24 PROCEDURE — 6360000002 HC RX W HCPCS: Performed by: FAMILY MEDICINE

## 2021-02-24 PROCEDURE — 6360000002 HC RX W HCPCS: Performed by: HOSPITALIST

## 2021-02-24 PROCEDURE — 99232 SBSQ HOSP IP/OBS MODERATE 35: CPT | Performed by: INTERNAL MEDICINE

## 2021-02-24 RX ORDER — SODIUM CHLORIDE 0.9 % (FLUSH) 0.9 %
10 SYRINGE (ML) INJECTION PRN
Status: DISCONTINUED | OUTPATIENT
Start: 2021-02-24 | End: 2021-02-26 | Stop reason: HOSPADM

## 2021-02-24 RX ORDER — LINEZOLID 600 MG/1
600 TABLET, FILM COATED ORAL EVERY 12 HOURS SCHEDULED
Status: DISCONTINUED | OUTPATIENT
Start: 2021-02-24 | End: 2021-02-26 | Stop reason: HOSPADM

## 2021-02-24 RX ORDER — CEFDINIR 300 MG/1
300 CAPSULE ORAL EVERY 12 HOURS SCHEDULED
Status: DISCONTINUED | OUTPATIENT
Start: 2021-02-24 | End: 2021-02-26 | Stop reason: HOSPADM

## 2021-02-24 RX ORDER — IBUPROFEN 400 MG/1
400 TABLET ORAL EVERY 6 HOURS PRN
Status: DISCONTINUED | OUTPATIENT
Start: 2021-02-24 | End: 2021-02-26 | Stop reason: HOSPADM

## 2021-02-24 RX ORDER — LORAZEPAM 0.5 MG/1
0.5 TABLET ORAL 4 TIMES DAILY PRN
Status: DISCONTINUED | OUTPATIENT
Start: 2021-02-24 | End: 2021-02-26 | Stop reason: HOSPADM

## 2021-02-24 RX ORDER — 0.9 % SODIUM CHLORIDE 0.9 %
80 INTRAVENOUS SOLUTION INTRAVENOUS ONCE
Status: COMPLETED | OUTPATIENT
Start: 2021-02-24 | End: 2021-02-24

## 2021-02-24 RX ADMIN — SODIUM CHLORIDE 80 ML: 9 INJECTION, SOLUTION INTRAVENOUS at 15:31

## 2021-02-24 RX ADMIN — MORPHINE SULFATE 2 MG: 2 INJECTION, SOLUTION INTRAMUSCULAR; INTRAVENOUS at 09:21

## 2021-02-24 RX ADMIN — CEFDINIR 300 MG: 300 CAPSULE ORAL at 21:35

## 2021-02-24 RX ADMIN — LORAZEPAM 0.5 MG: 0.5 TABLET ORAL at 15:57

## 2021-02-24 RX ADMIN — ACETAMINOPHEN 650 MG: 325 TABLET ORAL at 20:09

## 2021-02-24 RX ADMIN — ACETAMINOPHEN 650 MG: 325 TABLET ORAL at 04:30

## 2021-02-24 RX ADMIN — IOHEXOL 30 ML: 300 INJECTION, SOLUTION INTRAVENOUS at 15:32

## 2021-02-24 RX ADMIN — VANCOMYCIN HYDROCHLORIDE 1500 MG: 5 INJECTION, POWDER, LYOPHILIZED, FOR SOLUTION INTRAVENOUS at 13:19

## 2021-02-24 RX ADMIN — OXYCODONE AND ACETAMINOPHEN 1 TABLET: 5; 325 TABLET ORAL at 18:14

## 2021-02-24 RX ADMIN — ENOXAPARIN SODIUM 40 MG: 100 INJECTION SUBCUTANEOUS at 21:34

## 2021-02-24 RX ADMIN — VANCOMYCIN HYDROCHLORIDE 1500 MG: 5 INJECTION, POWDER, LYOPHILIZED, FOR SOLUTION INTRAVENOUS at 04:14

## 2021-02-24 RX ADMIN — PIPERACILLIN AND TAZOBACTAM 3375 MG: 3; .375 INJECTION, POWDER, LYOPHILIZED, FOR SOLUTION INTRAVENOUS at 07:01

## 2021-02-24 RX ADMIN — OXYCODONE AND ACETAMINOPHEN 1 TABLET: 5; 325 TABLET ORAL at 11:07

## 2021-02-24 RX ADMIN — PIPERACILLIN AND TAZOBACTAM 3375 MG: 3; .375 INJECTION, POWDER, LYOPHILIZED, FOR SOLUTION INTRAVENOUS at 15:57

## 2021-02-24 RX ADMIN — OXYCODONE AND ACETAMINOPHEN 1 TABLET: 5; 325 TABLET ORAL at 07:01

## 2021-02-24 RX ADMIN — MORPHINE SULFATE 2 MG: 2 INJECTION, SOLUTION INTRAMUSCULAR; INTRAVENOUS at 00:54

## 2021-02-24 RX ADMIN — OXYCODONE AND ACETAMINOPHEN 1 TABLET: 5; 325 TABLET ORAL at 22:17

## 2021-02-24 RX ADMIN — IOPAMIDOL 75 ML: 755 INJECTION, SOLUTION INTRAVENOUS at 15:31

## 2021-02-24 RX ADMIN — LINEZOLID 600 MG: 600 TABLET, FILM COATED ORAL at 21:35

## 2021-02-24 RX ADMIN — OXYCODONE AND ACETAMINOPHEN 1 TABLET: 5; 325 TABLET ORAL at 02:57

## 2021-02-24 RX ADMIN — MORPHINE SULFATE 2 MG: 2 INJECTION, SOLUTION INTRAMUSCULAR; INTRAVENOUS at 15:23

## 2021-02-24 RX ADMIN — Medication 10 ML: at 15:32

## 2021-02-24 RX ADMIN — ENOXAPARIN SODIUM 40 MG: 100 INJECTION SUBCUTANEOUS at 09:21

## 2021-02-24 ASSESSMENT — PAIN DESCRIPTION - ONSET
ONSET: ON-GOING
ONSET: ON-GOING

## 2021-02-24 ASSESSMENT — ENCOUNTER SYMPTOMS
RESPIRATORY NEGATIVE: 1
COLOR CHANGE: 1
ABDOMINAL PAIN: 1

## 2021-02-24 ASSESSMENT — PAIN SCALES - GENERAL
PAINLEVEL_OUTOF10: 3
PAINLEVEL_OUTOF10: 7

## 2021-02-24 ASSESSMENT — PAIN DESCRIPTION - ORIENTATION
ORIENTATION: LOWER;MID
ORIENTATION: MID
ORIENTATION: MID
ORIENTATION: LOWER;MID

## 2021-02-24 ASSESSMENT — PAIN - FUNCTIONAL ASSESSMENT
PAIN_FUNCTIONAL_ASSESSMENT: ACTIVITIES ARE NOT PREVENTED
PAIN_FUNCTIONAL_ASSESSMENT: ACTIVITIES ARE NOT PREVENTED

## 2021-02-24 ASSESSMENT — PAIN DESCRIPTION - PAIN TYPE: TYPE: ACUTE PAIN

## 2021-02-24 ASSESSMENT — PAIN DESCRIPTION - LOCATION: LOCATION: ABDOMEN

## 2021-02-24 ASSESSMENT — PAIN DESCRIPTION - DESCRIPTORS: DESCRIPTORS: DULL;CRAMPING

## 2021-02-24 ASSESSMENT — PAIN DESCRIPTION - FREQUENCY: FREQUENCY: CONTINUOUS

## 2021-02-24 NOTE — PROGRESS NOTES
Pharmacy Note  Vancomycin Consult - Brief Note     Vancomycin Day: 6  Current Dose:  Vancomycin 1500 mg q8h  Patient's labs, cultures, vitals, and vancomycin regimen reviewed. No changes today. Current diagnosis for which MRSA is suspected/confirmed: Abdominal abscess  ONLY for suspected pneumonia or COPD: MRSA nasal swab  N/A: Non respiratory infection. .        TEMP: 98.6 F  Calculated CrCl based on IBW:  88.49 mL/min    Recent Labs     02/23/21  0615   WBC 7.7     Recent Labs     02/23/21  0615 02/24/21  0602   CREATININE 0.63 0.73     Estimated Creatinine Clearance: 147 mL/min (based on SCr of 0.73 mg/dL). Intake/Output Summary (Last 24 hours) at 2/24/2021 1336  Last data filed at 2/23/2021 2128  Gross per 24 hour   Intake 10 ml   Output --   Net 10 ml       Thank you for the consult. Pharmacy will continue to follow.   Bong Howell [facility]  2/24/2021 1:36 PM

## 2021-02-24 NOTE — PROGRESS NOTES
[address] Hospitalist        2/24/2021   5:15 PM    Name:  Coco Pinzon  MRN:    [id number]     Acct:     [de-identified]   Room:  2006/2006-02  IP Day: 6     Admit Date: 2/18/2021  7:45 PM  PCP: No primary care provider on file. C/C:   Chief Complaint   Patient presents with    Abscess     lower abdomen       Assessment:      · Lower abdominal wall cellulitis/panniculitis  · Sepsis  · Concern for MRSA  · Dehydration   · Tobacco use      Plan:        · Patient currently on MedSurg with telemetry  · O2 maintain oxygen saturation greater than 92%  · Blood culture  · CT abdomen and pelvis for evaluation of cellulitis/panniculitis  · Pain control/DC IV morphine. · Added Ativan  · Antibiotics as below  · ID on board  · DVT and GI prophylaxis  · Continue to monitor/telemetry/CBC with differential daily/BMP daily  · Continue medications as below    Scheduled Meds:   linezolid  600 mg Oral 2 times per day    cefdinir  300 mg Oral 2 times per day    enoxaparin  40 mg Subcutaneous BID    nicotine  1 patch Transdermal Daily     Continuous Infusions:    PRN Meds:      ibuprofen, 400 mg, Q6H PRN      sodium chloride flush, 10 mL, PRN      LORazepam, 0.5 mg, 4x Daily PRN      potassium chloride, 40 mEq, PRN    Or      potassium alternative oral replacement, 40 mEq, PRN    Or      potassium chloride, 10 mEq, PRN      magnesium sulfate, 1,000 mg, PRN      oxyCODONE-acetaminophen, 1 tablet, Q4H PRN      diphenhydrAMINE, 25 mg, Q8H PRN      senna, 1 tablet, BID PRN      docusate sodium, 100 mg, BID PRN      metoprolol, 5 mg, Q6H PRN      acetaminophen, 650 mg, Q4H PRN      sodium chloride flush, 10 mL, PRN            Subjective:     Patient seen and examined at bedside. Complaining of pain in lower abdominal area. Had extensive discussion regarding pain medication control. Afebrile  Pt.  Denies any CP, SOB, palpitation, HA, dizziness, chills, cough, cold, changes in urination, BM or skin changes. ROS:  A 10 point system reviewed and negative otherwise mentioned above. Physical Examination:      Vitals:  BP (!) 157/86   Pulse 68   Temp 97.9 °F (36.6 °C) (Oral)   Resp 18   Ht 5' 5\" (1.651 m)   Wt (!) 334 lb (151.5 kg)   LMP 2021   SpO2 97%   BMI 55.58 kg/m²   Temp (24hrs), Av.9 °F (36.6 °C), Min:97.5 °F (36.4 °C), Max:98.6 °F (37 °C)    Weight:   Wt Readings from Last 3 Encounters:   21 (!) 334 lb (151.5 kg)   20 (!) 305 lb (138.3 kg)     I/O last 3 completed shifts:  I/O last 3 completed shifts: In: 10 [I.V.:10]  Out: -      No results for input(s): POCGLU in the last 72 hours. General appearance - alert, obese, and in no acute distress  Mental status - oriented to person, place, and time with normal affect  Head - normocephalic and atraumatic  Eyes - pupils equal and reactive, extraocular eye movements intact, conjunctiva clear  Ears - hearing appears to be intact  Nose - no drainage noted  Mouth - mucous membranes moist  Neck - supple, no carotid bruits, thyroid not palpable  Chest - clear to auscultation, normal effort  Heart - normal rate, regular rhythm, no murmur  Abdomen - soft, nontender, nondistended, bowel sounds present all four quadrants, no masses, hepatomegaly or splenomegaly  Neurological - normal speech, no focal findings or movement disorder noted, cranial nerves II through XII grossly intact  Extremities - peripheral pulses palpable, no pedal edema or calf pain with palpation  Skin -lower abdominal induration, erythema, tenderness        Medications:      Allergies: No Known Allergies    Current Meds:   Current Facility-Administered Medications:     ibuprofen (ADVIL;MOTRIN) tablet 400 mg, 400 mg, Oral, Q6H PRN, Chaparro Vaca DO    sodium chloride flush 0.9 % injection 10 mL, 10 mL, Intravenous, PRN, Chaparro Vaca DO, 10 mL at 21 1532    LORazepam (ATIVAN) tablet 0.5 mg, 0.5 mg, Oral, 4x Daily PRN, Stefano Blackburn MD, 0.5 mg at 02/24/21 1557    linezolid (ZYVOX) tablet 600 mg, 600 mg, Oral, 2 times per day, Steve Palomares MD    cefdinir (OMNICEF) capsule 300 mg, 300 mg, Oral, 2 times per day, Steve Palomares MD    potassium chloride (KLOR-CON M) extended release tablet 40 mEq, 40 mEq, Oral, PRN **OR** potassium bicarb-citric acid (EFFER-K) effervescent tablet 40 mEq, 40 mEq, Oral, PRN **OR** potassium chloride 10 mEq/100 mL IVPB (Peripheral Line), 10 mEq, Intravenous, PRN, Justin Holliday MD    magnesium sulfate 1000 mg in dextrose 5% 100 mL IVPB, 1,000 mg, Intravenous, PRN, Justin Holliday MD    oxyCODONE-acetaminophen (PERCOCET) 5-325 MG per tablet 1 tablet, 1 tablet, Oral, Q4H PRN, Emilee Olson MD, 1 tablet at 02/24/21 1107    diphenhydrAMINE (BENADRYL) tablet 25 mg, 25 mg, Oral, Q8H PRN, Emilee Olson MD, 25 mg at 02/21/21 2032    senna (SENOKOT) tablet 8.6 mg, 1 tablet, Oral, BID PRN, Emilee Olson MD, 8.6 mg at 02/21/21 1320    docusate sodium (COLACE) capsule 100 mg, 100 mg, Oral, BID PRN, Emilee Olson MD, 100 mg at 02/21/21 1320    metoprolol (LOPRESSOR) injection 5 mg, 5 mg, Intravenous, Q6H PRN, Emilee Olson MD, 5 mg at 02/20/21 2151    acetaminophen (TYLENOL) tablet 650 mg, 650 mg, Oral, Q4H PRN, Emilee Olson MD, 650 mg at 02/24/21 0430    enoxaparin (LOVENOX) injection 40 mg, 40 mg, Subcutaneous, BID, Emilee Oslon MD, 40 mg at 02/24/21 0921    nicotine (NICODERM CQ) 21 MG/24HR 1 patch, 1 patch, Transdermal, Daily, Emilee Olson MD    sodium chloride flush 0.9 % injection 10 mL, 10 mL, Intravenous, PRN, Jason Stein DO, 10 mL at 02/23/21 2128      I/O (24Hr):     Intake/Output Summary (Last 24 hours) at 2/24/2021 1715  Last data filed at 2/23/2021 2128  Gross per 24 hour   Intake 10 ml   Output --   Net 10 ml       Data:           Labs:    Hematology:  Recent Labs     02/23/21  0615   WBC 7.7   RBC 4.26   HGB 12.7   HCT 39.9   MCV 93.7   MCH 29.8   MCHC 31.8   RDW 12.3   PLT 277   MPV 10.1     Chemistry:  Recent Labs     02/23/21  0615 02/24/21  0602   NA  --  136   K  --  3.8   CL  --  102   CO2  --  27   GLUCOSE  --  205*   BUN 9 10   CREATININE 0.63 0.73   MG  --  1.7   ANIONGAP  --  7*   LABGLOM >60 >60   GFRAA >60 >60   CALCIUM  --  8.3*     No results for input(s): PROT, LABALBU, LABA1C, K5PGMJE, I2ESKDN, FT4, TSH, AST, ALT, LDH, GGT, ALKPHOS, LABGGT, BILITOT, BILIDIR, AMMONIA, AMYLASE, LIPASE, LACTATE, CHOL, HDL, LDLCHOLESTEROL, CHOLHDLRATIO, TRIG, VLDL, UYY52YH, PHENYTOIN, PHENYF, URICACID, POCGLU in the last 72 hours. Lab Results   Component Value Date/Time    SPECIAL NOT REPORTED 02/21/2021 09:20 AM     Lab Results   Component Value Date/Time    CULTURE NO GROWTH 3 DAYS 02/21/2021 09:20 AM       No results found for: POCPH, PHART, PH, POCPCO2, DXM1ITL, PCO2, POCPO2, PO2ART, PO2, POCHCO3, JZN6BHY, HCO3, NBEA, PBEA, BEART, BE, THGBART, THB, JVZ8CYP, CDSO2IZE, V1GFLMTM, O2SAT, FIO2    Radiology:    Ct Abdomen Pelvis W Iv Contrast Additional Contrast? Oral    Result Date: 2/19/2021  There is mild cutaneous/subcutaneus soft tissue infiltration involving the lower anterior abdominal and pelvic wall. Findings are consistent with reported cellulitis. However no abscess collection is noted. No other acute abnormality within the abdomen and pelvis. All radiological studies reviewed  Code Status:  Full Code        Electronically signed by Lauren Navarro MD on 2/24/2021 at 5:15 PM    This note was created with the assistance of a speech-recognition program.  Although the intention is to generate a document that actually reflects the content of the visit, no guarantees can be provided that every mistake has been identified and corrected by editing. Note was updated later by me after  physical examination and  completion of the assessment.

## 2021-02-24 NOTE — PLAN OF CARE
Problem: Mobility - Impaired:  Goal: Mobility will improve to maximum level  Description: Mobility will improve to maximum level  Outcome: Ongoing     Problem: Pain:  Goal: Pain level will decrease  Description: Pain level will decrease  Outcome: Ongoing     Problem: Pain:  Goal: Control of acute pain  Description: Control of acute pain  Outcome: Ongoing     Problem: Skin Integrity - Impaired:  Goal: Will show no infection signs and symptoms  Description: Will show no infection signs and symptoms  Outcome: Ongoing     Problem: Skin Integrity - Impaired:  Goal: Absence of new skin breakdown  Description: Absence of new skin breakdown  Outcome: Ongoing

## 2021-02-24 NOTE — PROGRESS NOTES
General Surgery:  Daily Progress Note                 PATIENT NAME: Nilam Gary     TODAY'S DATE: 2/24/2021, 6:19 AM    SUBJECTIVE:     Pt seen and examined at bedside. No acute events overnight. Afebrile. Pt states overall pain is about the same as yesterday. Erythema slightly improved. Denies fever, chills, nausea, vomiting, chest pain, and SOB. OBJECTIVE:   VITALS:  /62   Pulse 64   Temp 98.6 °F (37 °C) (Oral)   Resp 16   Ht 5' 5\" (1.651 m)   Wt (!) 334 lb (151.5 kg)   LMP 02/01/2021   SpO2 95%   BMI 55.58 kg/m²      INTAKE/OUTPUT:      Intake/Output Summary (Last 24 hours) at 2/24/2021 5007  Last data filed at 2/23/2021 2128  Gross per 24 hour   Intake 250 ml   Output --   Net 250 ml       PHYSICAL EXAM:  General Appearance:  awake, alert, oriented, in no acute distress  Lungs:  Normal expansion. No inc WOB  Heart:  Heart regular rate and rhythm  Abdomen:  soft, ND, tender in lower abdomen near induration, erythema at previous markings but lighter in nature, no blisters or boils. I&D site clean and dry. Data:  CBC with Differential:    Lab Results   Component Value Date    WBC 7.7 02/23/2021    RBC 4.26 02/23/2021    HGB 12.7 02/23/2021    HCT 39.9 02/23/2021     02/23/2021    MCV 93.7 02/23/2021    MCH 29.8 02/23/2021    MCHC 31.8 02/23/2021    RDW 12.3 02/23/2021    LYMPHOPCT 21 02/23/2021    MONOPCT 6 02/23/2021    BASOPCT 1 02/23/2021    MONOSABS 0.46 02/23/2021    LYMPHSABS 1.62 02/23/2021    EOSABS 0.11 02/23/2021    BASOSABS 0.04 02/23/2021    DIFFTYPE NOT REPORTED 02/23/2021     BMP:    Lab Results   Component Value Date     02/19/2021    K 3.9 02/19/2021     02/19/2021    CO2 24 02/19/2021    BUN 9 02/23/2021    CREATININE 0.63 02/23/2021    CALCIUM 8.1 02/19/2021    GFRAA >60 02/23/2021    LABGLOM >60 02/23/2021    GLUCOSE 127 02/19/2021       ASSESSMENT:  Active Hospital Problems    Diagnosis Date Noted    Cellulitis [L03.90] 02/18/2021       45 y. o. female with panniculitis    Plan:  1. Medical mgt per primary  2. Ok for diet  3. Daily packing changes   4. Ok to shower  5. Pain control  6. Continue antibiotics, ID following along  7. F/u AM cbc  8. Will order CT abd/pelvis to re-eval for any drainable fluid collection    Electronically signed by Beryl Hernandes DO  on 2/24/2021 at 6:19 AM     General Surgery Attending Attestation Note    Patient was seen and examined. I agree with the above resident's exam, assessment and plan.      Will recheck CT abd/pelvis  Erythema getting better today    Shadia Driscoll, [address], [address], [address]  Office: [phone]  After Hours: Please call answering service

## 2021-02-24 NOTE — PROGRESS NOTES
[facility]  Progress Note    Melvin Vargas  MRN: [id number]  Date: 2/24/2021  LOS: 6     Reason for F/U :   Abdominal wall cellulitis    Impression :   1. Anterior abdominal wall/mons pubis area abscess with associated cellulitis  2. Morbid obesity    Recommendations:   · The cellulitic changes in the anterior abdominal wall definitely overall improved. · There is still some intense erythroderma in the lower abdomen and there remains an area of indurated soft tissue. · The patient had a follow-up CT scan of the abdomen pelvis done today that does not show any abscess formation. · The plan will be to try to switch her to oral antimicrobial therapy with Omnicef and Zyvox  · We will monitor her response to the antimicrobial therapy over the next 24 hours and if she continues to improve then she can be discharged on this regimen on Friday. Infection Control Recommendations:   Universal precautions    Discharge Planning:   Estimated Length of IV antimicrobials: To be determined  Patient will need Midline Catheter Insertion/ PICC line Insertion: No  [facility]  [facility],  LT[facility]: Undetermined  Patient willneed outpatient wound care: No    Medical Decision making / Summary of Stay:   Melvin Vargas is a 41y.o.-year-old female who was initially admitted on 2/18/2021. Lilia was in her usual state of health until Wednesday when she reports a bump in the mons pubis area that was very hard, red and swollen. By Thursday she reports that it was enlarging and became like baseball sized by noon. The patient then reports around 5/6 PM it had doubled in size and she was having some associated fevers and chills. She does not report any prior soft tissue infections especially not with MRSA.   She ended up going to the emergency department as she was developing cellulitis of the lower abdomen and the patient had blood cultures done started on IV antimicrobial therapy and was transferred [facility] for admission.     The patient was seen by the general surgery team and a CAT scan of the abdomen pelvis had been done showing no drainable fluid collection. The patient had initially been started on vancomycin and Zosyn was added. The patient's abdominal wall cellulitic changes had persisted and I was asked to evaluate and help with antibiotic choice. Current evaluation:2021    BP (!) 157/86   Pulse 68   Temp 97.9 °F (36.6 °C) (Oral)   Resp 18   Ht 5' 5\" (1.651 m)   Wt (!) 334 lb (151.5 kg)   LMP 2021   SpO2 97%   BMI 55.58 kg/m²     Temperature Range: Temp: 97.9 °F (36.6 °C) Temp  Av.9 °F (36.6 °C)  Min: 97.5 °F (36.4 °C)  Max: 98.6 °F (37 °C)  The patient is seen and evaluated at bedside she is awake and alert in no acute distress. She is trying to take less pain medications. She was sitting in the chair when I came in but was able to get up and ambulate and get to the bed for evaluation. She still does have some abdominal pain. Review of Systems   Constitutional: Negative. Respiratory: Negative. Cardiovascular: Negative. Gastrointestinal: Positive for abdominal pain. Genitourinary: Negative. Musculoskeletal: Negative. Skin: Positive for color change and rash. Neurological: Negative. Psychiatric/Behavioral: Negative. Physical Examination :     Physical Exam  Constitutional:       Appearance: She is well-developed. She is obese. HENT:      Head: Normocephalic and atraumatic. Neck:      Musculoskeletal: Normal range of motion and neck supple. Cardiovascular:      Rate and Rhythm: Regular rhythm. Heart sounds: Normal heart sounds. Pulmonary:      Effort: Pulmonary effort is normal.      Breath sounds: Normal breath sounds. Abdominal:      General: Bowel sounds are normal.      Palpations: Abdomen is soft. Comments:  There are some soft tissue induration in the mons pubis area where she has a wound with packing. The anterior abdominal wall cellulitis is overall improved   Skin:     General: Skin is warm and dry. Neurological:      Mental Status: She is alert and oriented to person, place, and time. Laboratory data:   I have independently reviewed the followinglabs:  CBC with Differential:   Recent Labs     02/23/21  0615   WBC 7.7   HGB 12.7   HCT 39.9      LYMPHOPCT 21*   MONOPCT 6     BMP:   Recent Labs     02/23/21  0615 02/24/21  0602   NA  --  136   K  --  3.8   CL  --  102   CO2  --  27   BUN 9 10   CREATININE 0.63 0.73   MG  --  1.7     Hepatic Function Panel: No results for input(s): PROT, LABALBU, BILIDIR, IBILI, BILITOT, ALKPHOS, ALT, AST in the last 72 hours. No results found for: PROCAL  No results found for: CRP  No results found for: SEDRATE      No results found for: DDIMER  No results found for: FERRITIN  No results found for: LDH  No results found for: FIBRINOGEN    No results found for requested labs within last 30 days. No results found for: COVID19    Recent Labs     02/23/21  0951   [facility] 15.1       Imaging Studies:   No new imaging    Cultures:     Culture, Anaerobic and Aerobic [[id number]] (Abnormal) Collected: 02/21/21 0920   Order Status: Completed Specimen: Abscess Updated: 02/24/21 1616    Specimen Description . ABSCESS    Special Requests NOT REPORTED    Direct Exam RARE NEUTROPHILSAbnormal      NO BACTERIA SEEN    Culture NO GROWTH 3 DAYS   Culture, Blood 1 [[id number]] Collected: 02/18/21 2020   Order Status: Completed Specimen: Blood Updated: 02/24/21 0015    Specimen Description . BLOOD    Special Requests RIGHT FOREARM 20ML    Culture NO GROWTH 6 DAYS   Culture, Blood 1 [[id number]] Collected: 02/18/21 2010   Order Status: Completed Specimen: Blood Updated: 02/24/21 0015    Specimen Description . BLOOD    Special Requests RIGHT ANTICUBITAL 20ML    Culture NO GROWTH 6 DAYS       Medications:      piperacillin-tazobactam  3,375 mg Intravenous Q8H    vancomycin  1,500 mg Intravenous Q8H    enoxaparin  40 mg Subcutaneous BID    nicotine  1 patch Transdermal Daily    vancomycin (VANCOCIN) intermittent dosing (placeholder)   Other RX Placeholder           [facility]  Anni Castro  [employer] messaging  OFFICE: [phone]      Electronically signed by Anni Castro MD on 2/24/2021 at 4:29 PM  Thank you for allowing us to participate in the care of this patient. Please call with questions. This note iscreated with the assistance of a speech recognition program.  While intending to generate a document that actually reflects the content of the visit, the document can still have some errors including those of syntax andsound a like substitutions which may escape proof reading. In such instances, actual meaning can be extrapolated by contextual diversion.

## 2021-02-25 LAB
ANION GAP SERPL CALCULATED.3IONS-SCNC: 8 MMOL/L (ref 9–17)
BUN BLDV-MCNC: 13 MG/DL (ref 6–20)
BUN/CREAT BLD: 22 (ref 9–20)
CALCIUM SERPL-MCNC: 8.9 MG/DL (ref 8.6–10.4)
CHLORIDE BLD-SCNC: 101 MMOL/L (ref 98–107)
CO2: 28 MMOL/L (ref 20–31)
CREAT SERPL-MCNC: 0.6 MG/DL (ref 0.5–0.9)
GFR AFRICAN AMERICAN: >60 ML/MIN
GFR NON-AFRICAN AMERICAN: >60 ML/MIN
GFR SERPL CREATININE-BSD FRML MDRD: ABNORMAL ML/MIN/{1.73_M2}
GFR SERPL CREATININE-BSD FRML MDRD: ABNORMAL ML/MIN/{1.73_M2}
GLUCOSE BLD-MCNC: 142 MG/DL (ref 70–99)
MAGNESIUM: 1.9 MG/DL (ref 1.6–2.6)
POTASSIUM SERPL-SCNC: 4.7 MMOL/L (ref 3.7–5.3)
SODIUM BLD-SCNC: 137 MMOL/L (ref 135–144)

## 2021-02-25 PROCEDURE — 2500000003 HC RX 250 WO HCPCS: Performed by: FAMILY MEDICINE

## 2021-02-25 PROCEDURE — 6360000002 HC RX W HCPCS: Performed by: FAMILY MEDICINE

## 2021-02-25 PROCEDURE — 6370000000 HC RX 637 (ALT 250 FOR IP): Performed by: FAMILY MEDICINE

## 2021-02-25 PROCEDURE — 99232 SBSQ HOSP IP/OBS MODERATE 35: CPT | Performed by: INTERNAL MEDICINE

## 2021-02-25 PROCEDURE — 80048 BASIC METABOLIC PNL TOTAL CA: CPT

## 2021-02-25 PROCEDURE — 83735 ASSAY OF MAGNESIUM: CPT

## 2021-02-25 PROCEDURE — 1200000000 HC SEMI PRIVATE

## 2021-02-25 PROCEDURE — 6370000000 HC RX 637 (ALT 250 FOR IP): Performed by: INTERNAL MEDICINE

## 2021-02-25 PROCEDURE — 6370000000 HC RX 637 (ALT 250 FOR IP): Performed by: HOSPITALIST

## 2021-02-25 PROCEDURE — 36415 COLL VENOUS BLD VENIPUNCTURE: CPT

## 2021-02-25 RX ORDER — AMLODIPINE BESYLATE 5 MG/1
5 TABLET ORAL DAILY
Status: DISCONTINUED | OUTPATIENT
Start: 2021-02-25 | End: 2021-02-26 | Stop reason: HOSPADM

## 2021-02-25 RX ADMIN — OXYCODONE AND ACETAMINOPHEN 1 TABLET: 5; 325 TABLET ORAL at 11:33

## 2021-02-25 RX ADMIN — ENOXAPARIN SODIUM 40 MG: 100 INJECTION SUBCUTANEOUS at 08:04

## 2021-02-25 RX ADMIN — OXYCODONE AND ACETAMINOPHEN 1 TABLET: 5; 325 TABLET ORAL at 19:29

## 2021-02-25 RX ADMIN — OXYCODONE AND ACETAMINOPHEN 1 TABLET: 5; 325 TABLET ORAL at 23:38

## 2021-02-25 RX ADMIN — LINEZOLID 600 MG: 600 TABLET, FILM COATED ORAL at 08:04

## 2021-02-25 RX ADMIN — LINEZOLID 600 MG: 600 TABLET, FILM COATED ORAL at 21:45

## 2021-02-25 RX ADMIN — METOPROLOL TARTRATE 5 MG: 5 INJECTION INTRAVENOUS at 03:15

## 2021-02-25 RX ADMIN — OXYCODONE AND ACETAMINOPHEN 1 TABLET: 5; 325 TABLET ORAL at 03:15

## 2021-02-25 RX ADMIN — ENOXAPARIN SODIUM 40 MG: 100 INJECTION SUBCUTANEOUS at 21:45

## 2021-02-25 RX ADMIN — AMLODIPINE BESYLATE 5 MG: 5 TABLET ORAL at 13:49

## 2021-02-25 RX ADMIN — OXYCODONE AND ACETAMINOPHEN 1 TABLET: 5; 325 TABLET ORAL at 15:25

## 2021-02-25 RX ADMIN — OXYCODONE AND ACETAMINOPHEN 1 TABLET: 5; 325 TABLET ORAL at 07:12

## 2021-02-25 RX ADMIN — CEFDINIR 300 MG: 300 CAPSULE ORAL at 21:45

## 2021-02-25 RX ADMIN — CEFDINIR 300 MG: 300 CAPSULE ORAL at 08:03

## 2021-02-25 ASSESSMENT — PAIN DESCRIPTION - LOCATION
LOCATION: ABDOMEN

## 2021-02-25 ASSESSMENT — PAIN DESCRIPTION - PAIN TYPE
TYPE: ACUTE PAIN

## 2021-02-25 ASSESSMENT — PAIN - FUNCTIONAL ASSESSMENT: PAIN_FUNCTIONAL_ASSESSMENT: PREVENTS OR INTERFERES SOME ACTIVE ACTIVITIES AND ADLS

## 2021-02-25 ASSESSMENT — PAIN SCALES - GENERAL
PAINLEVEL_OUTOF10: 7
PAINLEVEL_OUTOF10: 6
PAINLEVEL_OUTOF10: 0
PAINLEVEL_OUTOF10: 7
PAINLEVEL_OUTOF10: 6

## 2021-02-25 ASSESSMENT — PAIN DESCRIPTION - PROGRESSION: CLINICAL_PROGRESSION: GRADUALLY IMPROVING

## 2021-02-25 ASSESSMENT — ENCOUNTER SYMPTOMS
COLOR CHANGE: 1
RESPIRATORY NEGATIVE: 1
ABDOMINAL PAIN: 1

## 2021-02-25 ASSESSMENT — PAIN DESCRIPTION - DESCRIPTORS
DESCRIPTORS: ACHING
DESCRIPTORS: DISCOMFORT;SORE

## 2021-02-25 ASSESSMENT — PAIN DESCRIPTION - ORIENTATION
ORIENTATION: LOWER
ORIENTATION: LOWER

## 2021-02-25 ASSESSMENT — PAIN DESCRIPTION - FREQUENCY
FREQUENCY: CONTINUOUS
FREQUENCY: CONTINUOUS

## 2021-02-25 NOTE — PLAN OF CARE
Problem: Mobility - Impaired:  Goal: Mobility will improve to maximum level  Description: Mobility will improve to maximum level  2/25/2021 0033 by Carlie Metz RN  Outcome: Ongoing     Problem: Pain:  Goal: Pain level will decrease  Description: Pain level will decrease  2/25/2021 0033 by Carlie Metz RN  Outcome: Ongoing     Problem: Pain:  Goal: Control of acute pain  Description: Control of acute pain  2/25/2021 0033 by Carlie Metz RN  Outcome: Ongoing     Problem: Skin Integrity - Impaired:  Goal: Will show no infection signs and symptoms  Description: Will show no infection signs and symptoms  2/25/2021 0033 by Carlie Metz RN  Outcome: Ongoing

## 2021-02-25 NOTE — PROGRESS NOTES
cough, cold, changes in urination, BM or skin changes. ROS:  A 10 point system reviewed and negative otherwise mentioned above. Physical Examination:      Vitals:  BP (!) 156/117   Pulse 57   Temp 97 °F (36.1 °C) (Oral)   Resp 16   Ht 5' 5\" (1.651 m)   Wt (!) 334 lb (151.5 kg)   LMP 2021   SpO2 95%   BMI 55.58 kg/m²   Temp (24hrs), Av.5 °F (36.4 °C), Min:97 °F (36.1 °C), Max:97.9 °F (36.6 °C)    Weight:   Wt Readings from Last 3 Encounters:   21 (!) 334 lb (151.5 kg)   20 (!) 305 lb (138.3 kg)     I/O last 3 completed shifts:  No intake/output data recorded. No results for input(s): POCGLU in the last 72 hours. General appearance - alert, obese, and in no acute distress  Mental status - oriented to person, place, and time with normal affect  Head - normocephalic and atraumatic  Eyes - pupils equal and reactive, extraocular eye movements intact, conjunctiva clear  Ears - hearing appears to be intact  Nose - no drainage noted  Mouth - mucous membranes moist  Neck - supple, no carotid bruits, thyroid not palpable  Chest - clear to auscultation, normal effort  Heart - normal rate, regular rhythm, no murmur  Abdomen - soft, nontender, nondistended, bowel sounds present all four quadrants, no masses, hepatomegaly or splenomegaly  Neurological - normal speech, no focal findings or movement disorder noted, cranial nerves II through XII grossly intact  Extremities - peripheral pulses palpable, no pedal edema or calf pain with palpation  Skin -lower abdominal induration, erythema, tenderness        Medications:      Allergies: No Known Allergies    Current Meds:   Current Facility-Administered Medications:     ibuprofen (ADVIL;MOTRIN) tablet 400 mg, 400 mg, Oral, Q6H PRN, Louana Solders, DO    sodium chloride flush 0.9 % injection 10 mL, 10 mL, Intravenous, PRN, Louana Solders, DO, 10 mL at 21 1532    LORazepam (ATIVAN) tablet 0.5 mg, 0.5 mg, Oral, 4x Daily PRN, Maria Eugenia Cashing Yuridia Gomes MD, 0.5 mg at 02/24/21 1557    linezolid (ZYVOX) tablet 600 mg, 600 mg, Oral, 2 times per day, Dominic Reynoso MD, 600 mg at 02/25/21 0804    cefdinir (OMNICEF) capsule 300 mg, 300 mg, Oral, 2 times per day, Dominic Reynoso MD, 300 mg at 02/25/21 0803    potassium chloride (KLOR-CON M) extended release tablet 40 mEq, 40 mEq, Oral, PRN **OR** potassium bicarb-citric acid (EFFER-K) effervescent tablet 40 mEq, 40 mEq, Oral, PRN **OR** potassium chloride 10 mEq/100 mL IVPB (Peripheral Line), 10 mEq, Intravenous, PRN, Carmelita Garcia MD    magnesium sulfate 1000 mg in dextrose 5% 100 mL IVPB, 1,000 mg, Intravenous, PRN, Carmelita Garcia MD    oxyCODONE-acetaminophen (PERCOCET) 5-325 MG per tablet 1 tablet, 1 tablet, Oral, Q4H PRN, Ancelmo Ferrera MD, 1 tablet at 02/25/21 1133    diphenhydrAMINE (BENADRYL) tablet 25 mg, 25 mg, Oral, Q8H PRN, Emilee Olson MD, 25 mg at 02/21/21 2032    senna (SENOKOT) tablet 8.6 mg, 1 tablet, Oral, BID PRN, Ancelmo Ferrera MD, 8.6 mg at 02/21/21 1320    docusate sodium (COLACE) capsule 100 mg, 100 mg, Oral, BID PRN, Emilee Olson MD, 100 mg at 02/21/21 1320    metoprolol (LOPRESSOR) injection 5 mg, 5 mg, Intravenous, Q6H PRN, Emilee Olson MD, 5 mg at 02/25/21 0315    acetaminophen (TYLENOL) tablet 650 mg, 650 mg, Oral, Q4H PRN, Emilee Olson MD, 650 mg at 02/24/21 2009    enoxaparin (LOVENOX) injection 40 mg, 40 mg, Subcutaneous, BID, Emilee Olson MD, 40 mg at 02/25/21 0804    nicotine (NICODERM CQ) 21 MG/24HR 1 patch, 1 patch, Transdermal, Daily, Emilee Olson MD    sodium chloride flush 0.9 % injection 10 mL, 10 mL, Intravenous, PRN, Jason Stein DO, 10 mL at 02/23/21 2128      I/O (24Hr):     Intake/Output Summary (Last 24 hours) at 2/25/2021 1241  Last data filed at 2/25/2021 0953  Gross per 24 hour   Intake 410 ml   Output --   Net 410 ml       Data:           Labs:    Hematology:  Recent Labs     02/23/21  0615   WBC 7.7   RBC 4.26 HGB 12.7   HCT 39.9   MCV 93.7   MCH 29.8   MCHC 31.8   RDW 12.3      MPV 10.1     Chemistry:  Recent Labs     02/23/21  0615 02/24/21  0602 02/25/21  0623   NA  --  136 137   K  --  3.8 4.7   CL  --  102 101   CO2  --  27 28   GLUCOSE  --  205* 142*   BUN 9 10 13   CREATININE 0.63 0.73 0.60   MG  --  1.7 1.9   ANIONGAP  --  7* 8*   LABGLOM >60 >60 >60   GFRAA >60 >60 >60   CALCIUM  --  8.3* 8.9     No results for input(s): PROT, LABALBU, LABA1C, A0TTJCP, O2NAOOH, FT4, TSH, AST, ALT, LDH, GGT, ALKPHOS, LABGGT, BILITOT, BILIDIR, AMMONIA, AMYLASE, LIPASE, LACTATE, CHOL, HDL, LDLCHOLESTEROL, CHOLHDLRATIO, TRIG, VLDL, WKR22DG, PHENYTOIN, PHENYF, URICACID, POCGLU in the last 72 hours. Lab Results   Component Value Date/Time    SPECIAL NOT REPORTED 02/21/2021 09:20 AM     Lab Results   Component Value Date/Time    CULTURE NO GROWTH 3 DAYS 02/21/2021 09:20 AM       No results found for: POCPH, PHART, PH, POCPCO2, YFC5CZR, PCO2, POCPO2, PO2ART, PO2, POCHCO3, JGD5JSD, HCO3, NBEA, PBEA, BEART, BE, THGBART, THB, KQW3VGO, ZTRN3WLZ, B8XAGPUF, O2SAT, FIO2    Radiology:    Ct Abdomen Pelvis W Iv Contrast Additional Contrast? Oral    Result Date: 2/19/2021  There is mild cutaneous/subcutaneus soft tissue infiltration involving the lower anterior abdominal and pelvic wall. Findings are consistent with reported cellulitis. However no abscess collection is noted. No other acute abnormality within the abdomen and pelvis. All radiological studies reviewed  Code Status:  Full Code        Electronically signed by Divine Avalos MD on 2/25/2021 at 12:41 PM    This note was created with the assistance of a speech-recognition program.  Although the intention is to generate a document that actually reflects the content of the visit, no guarantees can be provided that every mistake has been identified and corrected by editing. Note was updated later by me after  physical examination and  completion of the assessment.

## 2021-02-25 NOTE — PLAN OF CARE
Problem: Discharge Planning:  Goal: Discharged to appropriate level of care  Description: Discharged to appropriate level of care  Outcome: Ongoing  Note: Identify potential discharge needs/barriers on admission  Involve family/patient/significant other in discharge process  Collaborate with Case Management/ for discharge needs/concerns       Problem: Mobility - Impaired:  Goal: Mobility will improve to maximum level  Description: Mobility will improve to maximum level  Outcome: Ongoing     Problem: Pain:  Goal: Control of acute pain  Description: Control of acute pain  Outcome: Ongoing  Note: Pain level assessed and rated on a 0-10 scale  Assess characteristics of pain  PRN pain medication given per pt request  Non-pharmacological interventions implemented  Report ineffective pain management to physician  Update pt and family of any changes  Pt instructed to call out with new onset of pain  Continue to monitor       Problem: Skin Integrity - Impaired:  Goal: Will show no infection signs and symptoms  Description: Will show no infection signs and symptoms  Outcome: Ongoing     Problem: Skin Integrity - Impaired:  Goal: Absence of new skin breakdown  Description: Absence of new skin breakdown  Outcome: Ongoing     Problem: Tobacco Use:  Goal: Inpatient tobacco use cessation counseling participation  Description: Inpatient tobacco use cessation counseling participation  Outcome: Ongoing  Note: Patient not agreeable to smoking cessation at this time  Nicotine patch ordered  Patient refused nicotine patch

## 2021-02-25 NOTE — PROGRESS NOTES
[facility]  Progress Note    Marcelo Irvin  MRN: [id number]  Date: 2/25/2021  LOS: 7     Reason for F/U :   Abdominal wall cellulitis    Impression :   1. Anterior abdominal wall/mons pubis area abscess with associated cellulitis  · The patient has had CT imaging studies done that did not show any abscess  2. Morbid obesity    Recommendations:   · Continue antimicrobial therapy with Omnicef and Zyvox  · I want to ensure that the cellulitic changes do not worsen in the next 24 hours and if this is the case the patient can be discharged home on the above regimen tomorrow  · If they are worsening cellulitic changes and the patient will need to be discharged on IV antimicrobial therapy    Infection Control Recommendations:   Universal precautions    Discharge Planning:   Estimated Length of IV antimicrobials: To be determined  Patient will need Midline Catheter Insertion/ PICC line Insertion: No  Patient will need: Home IV , [place],  SNF,  LTAC: Undetermined  Patient willneed outpatient wound care: No    Medical Decision making / Summary of Stay:   Marcelo Irivn is a 41y.o.-year-old female who was initially admitted on 2/18/2021. Lilia was in her usual state of health until Wednesday when she reports a bump in the mons pubis area that was very hard, red and swollen. By Thursday she reports that it was enlarging and became like baseball sized by noon. The patient then reports around 5/6 PM it had doubled in size and she was having some associated fevers and chills. She does not report any prior soft tissue infections especially not with MRSA.   She ended up going to the emergency department as she was developing cellulitis of the lower abdomen and the patient had blood cultures done started on IV antimicrobial therapy and was transferred to [facility] for admission.     The patient was seen by the general surgery team and a CAT scan of the abdomen pelvis had been done showing no drainable fluid collection. The patient had initially been started on vancomycin and Zosyn was added. The patient's abdominal wall cellulitic changes had persisted and I was asked to evaluate and help with antibiotic choice. Current evaluation:2021    BP (!) 156/117   Pulse 57   Temp 97 °F (36.1 °C) (Oral)   Resp 16   Ht 5' 5\" (1.651 m)   Wt (!) 334 lb (151.5 kg)   LMP 2021   SpO2 95%   BMI 55.58 kg/m²     Temperature Range: Temp: 97 °F (36.1 °C) Temp  Av.5 °F (36.4 °C)  Min: 97 °F (36.1 °C)  Max: 97.9 °F (36.6 °C)  The patient is seen and evaluated at bedside she is awake and alert in no acute distress. She still does have some abdominal pain though overall it is improved and she reports she is taking less pain medications. No significant drainage from the abdominal wall wound. Review of Systems   Constitutional: Negative. Respiratory: Negative. Cardiovascular: Negative. Gastrointestinal: Positive for abdominal pain. Genitourinary: Negative. Musculoskeletal: Negative. Skin: Positive for color change and rash. Neurological: Negative. Psychiatric/Behavioral: Negative. Physical Examination :     Physical Exam  Constitutional:       Appearance: She is well-developed. She is obese. HENT:      Head: Normocephalic and atraumatic. Neck:      Musculoskeletal: Normal range of motion and neck supple. Cardiovascular:      Rate and Rhythm: Regular rhythm. Heart sounds: Normal heart sounds. Pulmonary:      Effort: Pulmonary effort is normal.      Breath sounds: Normal breath sounds. Abdominal:      General: Bowel sounds are normal.      Palpations: Abdomen is soft. Comments: There are some soft tissue induration in the mons pubis area where she has a wound with packing. The anterior abdominal wall cellulitis is overall improved   Skin:     General: Skin is warm and dry.    Neurological:      Mental Status: She is alert and oriented to messaging  OFFICE: [phone]      Electronically signed by Zayda Jarquin MD on 2/25/2021 at 12:22 PM  Thank you for allowing us to participate in the care of this patient. Please call with questions. This note iscreated with the assistance of a speech recognition program.  While intending to generate a document that actually reflects the content of the visit, the document can still have some errors including those of syntax andsound a like substitutions which may escape proof reading. In such instances, actual meaning can be extrapolated by contextual diversion.

## 2021-02-25 NOTE — PROGRESS NOTES
CALCIUM 8.3 02/24/2021    GFRAA >60 02/24/2021    LABGLOM >60 02/24/2021    GLUCOSE 205 02/24/2021       ASSESSMENT:  Active Hospital Problems    Diagnosis Date Noted    Cellulitis [L03.90] 02/18/2021       41 y.o. female with panniculitis s/p I&D    Plan:  1. Medical mgt per primary  2. General diet  3. Daily packing changes   4. Ok to shower  5. Pain control  6. Continue antibiotics, ID following along    Electronically signed by Kana Brown MD  on 2/25/2021 at 5:16 AM     Attending Physician Statement  I have discussed the case, including pertinent history and exam findings with the resident. I agree with the assessment, plan and orders as documented by the resident. Patient seen and examined. Agree with above. Diet as tolerated. Repeat CAT scan yesterday reviewed demonstrating no drainable fluid collections. We will continue to monitor closely. She does seem to be clinically improving slowly with antibiotics.

## 2021-02-26 VITALS
SYSTOLIC BLOOD PRESSURE: 133 MMHG | BODY MASS INDEX: 48.82 KG/M2 | RESPIRATION RATE: 16 BRPM | OXYGEN SATURATION: 96 % | HEIGHT: 65 IN | WEIGHT: 293 LBS | TEMPERATURE: 97.9 F | DIASTOLIC BLOOD PRESSURE: 69 MMHG | HEART RATE: 58 BPM

## 2021-02-26 LAB
ANION GAP SERPL CALCULATED.3IONS-SCNC: 7 MMOL/L (ref 9–17)
BUN BLDV-MCNC: 13 MG/DL (ref 6–20)
BUN/CREAT BLD: 19 (ref 9–20)
CALCIUM SERPL-MCNC: 8.3 MG/DL (ref 8.6–10.4)
CHLORIDE BLD-SCNC: 104 MMOL/L (ref 98–107)
CO2: 27 MMOL/L (ref 20–31)
CREAT SERPL-MCNC: 0.68 MG/DL (ref 0.5–0.9)
CULTURE: ABNORMAL
DIRECT EXAM: ABNORMAL
DIRECT EXAM: ABNORMAL
GFR AFRICAN AMERICAN: >60 ML/MIN
GFR NON-AFRICAN AMERICAN: >60 ML/MIN
GFR SERPL CREATININE-BSD FRML MDRD: ABNORMAL ML/MIN/{1.73_M2}
GFR SERPL CREATININE-BSD FRML MDRD: ABNORMAL ML/MIN/{1.73_M2}
GLUCOSE BLD-MCNC: 185 MG/DL (ref 70–99)
Lab: ABNORMAL
MAGNESIUM: 1.7 MG/DL (ref 1.6–2.6)
POTASSIUM SERPL-SCNC: 4.1 MMOL/L (ref 3.7–5.3)
SODIUM BLD-SCNC: 138 MMOL/L (ref 135–144)
SPECIMEN DESCRIPTION: ABNORMAL

## 2021-02-26 PROCEDURE — 99232 SBSQ HOSP IP/OBS MODERATE 35: CPT | Performed by: INTERNAL MEDICINE

## 2021-02-26 PROCEDURE — 6370000000 HC RX 637 (ALT 250 FOR IP): Performed by: FAMILY MEDICINE

## 2021-02-26 PROCEDURE — 6370000000 HC RX 637 (ALT 250 FOR IP): Performed by: HOSPITALIST

## 2021-02-26 PROCEDURE — 80048 BASIC METABOLIC PNL TOTAL CA: CPT

## 2021-02-26 PROCEDURE — 83735 ASSAY OF MAGNESIUM: CPT

## 2021-02-26 PROCEDURE — 6370000000 HC RX 637 (ALT 250 FOR IP): Performed by: INTERNAL MEDICINE

## 2021-02-26 PROCEDURE — 36415 COLL VENOUS BLD VENIPUNCTURE: CPT

## 2021-02-26 PROCEDURE — 6360000002 HC RX W HCPCS: Performed by: FAMILY MEDICINE

## 2021-02-26 RX ORDER — LINEZOLID 600 MG/1
600 TABLET, FILM COATED ORAL EVERY 12 HOURS SCHEDULED
Qty: 28 TABLET | Refills: 0 | Status: SHIPPED | OUTPATIENT
Start: 2021-02-26 | End: 2021-03-12

## 2021-02-26 RX ORDER — CEFDINIR 300 MG/1
300 CAPSULE ORAL EVERY 12 HOURS SCHEDULED
Qty: 20 CAPSULE | Refills: 0 | Status: SHIPPED | OUTPATIENT
Start: 2021-02-26 | End: 2021-03-08

## 2021-02-26 RX ORDER — OXYCODONE HYDROCHLORIDE AND ACETAMINOPHEN 5; 325 MG/1; MG/1
1 TABLET ORAL EVERY 4 HOURS PRN
Qty: 15 TABLET | Refills: 0 | Status: SHIPPED | OUTPATIENT
Start: 2021-02-26 | End: 2021-03-01

## 2021-02-26 RX ORDER — AMLODIPINE BESYLATE 5 MG/1
5 TABLET ORAL DAILY
Qty: 30 TABLET | Refills: 3 | Status: SHIPPED | OUTPATIENT
Start: 2021-02-27

## 2021-02-26 RX ADMIN — AMLODIPINE BESYLATE 5 MG: 5 TABLET ORAL at 08:13

## 2021-02-26 RX ADMIN — OXYCODONE AND ACETAMINOPHEN 1 TABLET: 5; 325 TABLET ORAL at 08:51

## 2021-02-26 RX ADMIN — ENOXAPARIN SODIUM 40 MG: 100 INJECTION SUBCUTANEOUS at 08:13

## 2021-02-26 RX ADMIN — DIPHENHYDRAMINE HCL 25 MG: 25 TABLET ORAL at 01:45

## 2021-02-26 RX ADMIN — OXYCODONE AND ACETAMINOPHEN 1 TABLET: 5; 325 TABLET ORAL at 13:22

## 2021-02-26 RX ADMIN — OXYCODONE AND ACETAMINOPHEN 1 TABLET: 5; 325 TABLET ORAL at 03:37

## 2021-02-26 RX ADMIN — CEFDINIR 300 MG: 300 CAPSULE ORAL at 08:12

## 2021-02-26 RX ADMIN — LINEZOLID 600 MG: 600 TABLET, FILM COATED ORAL at 08:13

## 2021-02-26 ASSESSMENT — PAIN DESCRIPTION - FREQUENCY: FREQUENCY: CONTINUOUS

## 2021-02-26 ASSESSMENT — PAIN DESCRIPTION - DESCRIPTORS: DESCRIPTORS: DISCOMFORT;SORE

## 2021-02-26 ASSESSMENT — PAIN SCALES - GENERAL
PAINLEVEL_OUTOF10: 8
PAINLEVEL_OUTOF10: 5
PAINLEVEL_OUTOF10: 7

## 2021-02-26 ASSESSMENT — PAIN DESCRIPTION - PROGRESSION
CLINICAL_PROGRESSION: GRADUALLY IMPROVING
CLINICAL_PROGRESSION: GRADUALLY IMPROVING

## 2021-02-26 ASSESSMENT — ENCOUNTER SYMPTOMS
RESPIRATORY NEGATIVE: 1
ABDOMINAL PAIN: 1
COLOR CHANGE: 1

## 2021-02-26 NOTE — CARE COORDINATION
Discharge Planning:    Patient ready for d/c and Rc/RN asked if patient still wanted [facility] at d/c considering dressing orders had changed to neosporin ointment with a bandaid. Patient denied need for [facility] services now that orders had changed. Notified Hanh with [address].

## 2021-02-26 NOTE — PLAN OF CARE
Problem: Mobility - Impaired:  Goal: Mobility will improve to maximum level  2/26/2021 0412 by Lonny Barth RN  Outcome: Ongoing     Problem: Pain:  Goal: Pain level will decrease  2/26/2021 0412 by Lonny Barth RN  Outcome: Ongoing     Problem: Pain:  Goal: Control of acute pain  2/26/2021 0412 by Lonny Barth RN  Outcome: Ongoing     Problem: Skin Integrity - Impaired:  Goal: Absence of new skin breakdown  2/26/2021 0412 by Lonny Barth RN  Outcome: Ongoing     Problem: Skin Integrity - Impaired:  Goal: Will show no infection signs and symptoms  2/26/2021 0412 by Lonny Barth RN  Outcome: Ongoing

## 2021-02-26 NOTE — PROGRESS NOTES
General Surgery:  Daily Progress Note                 PATIENT NAME: Sumeet Monk     TODAY'S DATE: 2/26/2021, 5:40 AM    SUBJECTIVE:     Pt seen and examined at bedside. Vitals stable. Last night began having new back pain, sitting in chair this AM. Reports she feels worse, has more pain overnight as well as nausea. No emesis. Tolerating PO.    R.O.S. No documented fevers overnight  No acute vision or hearing changes  No substernal chest pain or palpitations  + back pain  No acute shortness of breath or productive cough  + lower abdominal discomfort, no nausea or vomiting  No dysuria or hematuria    OBJECTIVE:   VITALS:  BP (!) 148/66   Pulse 60   Temp 97.3 °F (36.3 °C) (Oral)   Resp 18   Ht 5' 5\" (1.651 m)   Wt (!) 334 lb (151.5 kg)   LMP 02/01/2021   SpO2 94%   BMI 55.58 kg/m²      INTAKE/OUTPUT:      Intake/Output Summary (Last 24 hours) at 2/26/2021 0540  Last data filed at 2/25/2021 1248  Gross per 24 hour   Intake 410 ml   Output 5 ml   Net 405 ml       PHYSICAL EXAM:  General Appearance:  awake, alert, oriented, in no acute distress  Lungs:  Normal expansion.  No inc WOB  Heart:  Heart regular rate and rhythm  Abdomen:  soft, ND, tender in lower abdomen, erythema remains present, packing in place, induration surrounding I&D site, no fluctuance, no active drainage    Data:  CBC with Differential:    Lab Results   Component Value Date    WBC 7.7 02/23/2021    RBC 4.26 02/23/2021    HGB 12.7 02/23/2021    HCT 39.9 02/23/2021     02/23/2021    MCV 93.7 02/23/2021    MCH 29.8 02/23/2021    MCHC 31.8 02/23/2021    RDW 12.3 02/23/2021    LYMPHOPCT 21 02/23/2021    MONOPCT 6 02/23/2021    BASOPCT 1 02/23/2021    MONOSABS 0.46 02/23/2021    LYMPHSABS 1.62 02/23/2021    EOSABS 0.11 02/23/2021    BASOSABS 0.04 02/23/2021    DIFFTYPE NOT REPORTED 02/23/2021     BMP:    Lab Results   Component Value Date     02/25/2021    K 4.7 02/25/2021     02/25/2021    CO2 28 02/25/2021    BUN 13 02/25/2021    CREATININE 0.60 02/25/2021    CALCIUM 8.9 02/25/2021    GFRAA >60 02/25/2021    LABGLOM >60 02/25/2021    GLUCOSE 142 02/25/2021       ASSESSMENT:  Active Hospital Problems    Diagnosis Date Noted    Cellulitis [L03.90] 02/18/2021       41 y.o. female with panniculitis s/p I&D    Plan:  1. Continue medical management per primary  2. OK for general diet  3. Daily hygiene  4. Daily packing changes   5. Pain control  6. Continue antibiotics, ID following along    Patient and plan discussed with Dr Leticia Michelle. Electronically signed by Claire House MD  on 2/26/2021 at 5:40 AM     General Surgery Attending Attestation Note    Patient was seen and examined. I agree with the above resident's exam, assessment and plan.      [id number] [address]ongora for general diet  Discussed with ID  Cellulitis improved on Zyvox  No need to pack wound any further  Neosporin ointment and bandaid for home  [id number] [address]ongora for discharge from surgical standpoint    Sarina Fat, [address], [address] Leticia Mcclain  Office: [phone]  After Hours: Please call answering service

## 2021-02-26 NOTE — PLAN OF CARE
Problem: Discharge Planning:  Goal: Discharged to appropriate level of care  Description: Discharged to appropriate level of care  Outcome: Completed     Problem:  Body Temperature - Imbalanced:  Goal: Ability to maintain a body temperature in the normal range will improve  Description: Ability to maintain a body temperature in the normal range will improve  Outcome: Completed     Problem: Mobility - Impaired:  Goal: Mobility will improve to maximum level  Description: Mobility will improve to maximum level  2/26/2021 1414 by Norah Tee RN  Outcome: Completed  2/26/2021 0412 by Anna Marie Harper RN  Outcome: Ongoing     Problem: Pain:  Goal: Pain level will decrease  Description: Pain level will decrease  2/26/2021 1414 by Norah Tee RN  Outcome: Completed  2/26/2021 0412 by Anna Marie Harper RN  Outcome: Ongoing  Goal: Control of acute pain  Description: Control of acute pain  2/26/2021 1414 by Norah Tee RN  Outcome: Completed  2/26/2021 0412 by Anna Marie Harper RN  Outcome: Ongoing  Goal: Control of chronic pain  Description: Control of chronic pain  Outcome: Completed     Problem: Skin Integrity - Impaired:  Goal: Will show no infection signs and symptoms  Description: Will show no infection signs and symptoms  2/26/2021 1414 by Norah Tee RN  Outcome: Completed  2/26/2021 0412 by Anna Marie Harper RN  Outcome: Ongoing  Goal: Absence of new skin breakdown  Description: Absence of new skin breakdown  2/26/2021 1414 by Norah Tee RN  Outcome: Completed  2/26/2021 0412 by Anna Marie Harper RN  Outcome: Ongoing     Problem: Tobacco Use:  Goal: Inpatient tobacco use cessation counseling participation  Description: Inpatient tobacco use cessation counseling participation  Outcome: Completed

## 2021-02-26 NOTE — PROGRESS NOTES
[facility]  Progress Note    Reema Barfield  MRN: [id number]  Date: 2/26/2021  LOS: 8     Reason for F/U :   Abdominal wall cellulitis    Impression :   1. Anterior abdominal wall/mons pubis area abscess with associated cellulitis  · Status post drainage of indurated area  · The patient has had CT imaging studies done that did not show any abscess  2. Morbid obesity    Recommendations:   · Continue antimicrobial therapy with Omnicef and Zyvox  · The cellulitic changes continue to improve. · The plan will be to discharge her on the above antibiotics today. · The patient can follow-up with me in the office in 2 weeks. Infection Control Recommendations:   Universal precautions    Discharge Planning:   Estimated Length of IV antimicrobials: To be determined  Patient will need Midline Catheter Insertion/ PICC line Insertion: No  Patient will need: Home IV , [place],  SNF,  LTAC: Undetermined  Patient willneed outpatient wound care: No    Medical Decision making / Summary of Stay:   Reema Barfield is a 41y.o.-year-old female who was initially admitted on 2/18/2021. Lilia was in her usual state of health until Wednesday when she reports a bump in the mons pubis area that was very hard, red and swollen. By Thursday she reports that it was enlarging and became like baseball sized by noon. The patient then reports around 5/6 PM it had doubled in size and she was having some associated fevers and chills. She does not report any prior soft tissue infections especially not with MRSA. She ended up going to the emergency department as she was developing cellulitis of the lower abdomen and the patient had blood cultures done started on IV antimicrobial therapy and was transferred to [facility] for admission.     The patient was seen by the general surgery team and a CAT scan of the abdomen pelvis had been done showing no drainable fluid collection.   The patient had initially been started on vancomycin and Zosyn was added. The patient's abdominal wall cellulitic changes had persisted and I was asked to evaluate and help with antibiotic choice. Current evaluation:2021    /69   Pulse 58   Temp 97.9 °F (36.6 °C) (Oral)   Resp 16   Ht 5' 5\" (1.651 m)   Wt (!) 334 lb (151.5 kg)   LMP 2021   SpO2 96%   BMI 55.58 kg/m²     Temperature Range: Temp: 97.9 °F (36.6 °C) Temp  Av.7 °F (36.5 °C)  Min: 97.3 °F (36.3 °C)  Max: 98.1 °F (36.7 °C)  The patient is seen and evaluated at bedside and she is awake and alert in no acute distress. She does report having some pruritus in the lower abdominal wall area that improved with Benadryl. Overall her abdominal pain is improved. She also does feel that the cellulitic changes are improved. Review of Systems   Constitutional: Negative. Respiratory: Negative. Cardiovascular: Negative. Gastrointestinal: Positive for abdominal pain. Genitourinary: Negative. Musculoskeletal: Negative. Skin: Positive for color change and rash. Neurological: Negative. Psychiatric/Behavioral: Negative. Physical Examination :     Physical Exam  Constitutional:       Appearance: She is well-developed. She is obese. HENT:      Head: Normocephalic and atraumatic. Neck:      Musculoskeletal: Normal range of motion and neck supple. Cardiovascular:      Rate and Rhythm: Regular rhythm. Heart sounds: Normal heart sounds. Pulmonary:      Effort: Pulmonary effort is normal.      Breath sounds: Normal breath sounds. Abdominal:      General: Bowel sounds are normal.      Palpations: Abdomen is soft. Comments: The abdominal wall cellulitic changes overall improved. The area of induration is also improved. The patient does have a small opening of the previous I&D site and the packing has been removed. Skin:     General: Skin is warm and dry.    Neurological:      Mental Status: She is alert and oriented to person, place, and time. Laboratory data:   I have independently reviewed the followinglabs:  CBC with Differential:   No results for input(s): WBC, HGB, HCT, PLT, SEGSPCT, BANDSPCT, LYMPHOPCT, MONOPCT, EOSPCT in the last 72 hours. BMP:   Recent Labs     02/25/21  0623 02/26/21  0511    138   K 4.7 4.1    104   CO2 28 27   BUN 13 13   CREATININE 0.60 0.68   MG 1.9 1.7     Hepatic Function Panel: No results for input(s): PROT, LABALBU, BILIDIR, IBILI, BILITOT, ALKPHOS, ALT, AST in the last 72 hours. No results found for: PROCAL  No results found for: CRP  No results found for: SEDRATE      No results found for: DDIMER  No results found for: FERRITIN  No results found for: LDH  No results found for: FIBRINOGEN    No results found for requested labs within last 30 days. No results found for: COVID19    No results for input(s): VANCOTROUGH in the last 72 hours. Imaging Studies:   No new imaging    Cultures:     Culture, Anaerobic and Aerobic [[id number]] (Abnormal) Collected: 02/21/21 0920   Order Status: Completed Specimen: Abscess Updated: 02/25/21 1605    Specimen Description . ABSCESS    Special Requests NOT REPORTED    Direct Exam RARE NEUTROPHILSAbnormal      NO BACTERIA SEEN    Culture NO GROWTH 4 DAYS   Culture, Blood 1 [[id number]] Collected: 02/18/21 2020   Order Status: Completed Specimen: Blood Updated: 02/24/21 0015    Specimen Description . BLOOD    Special Requests RIGHT FOREARM 20ML    Culture NO GROWTH 6 DAYS   Culture, Blood 1 [[id number]] Collected: 02/18/21 2010   Order Status: Completed Specimen: Blood Updated: 02/24/21 0015    Specimen Description . BLOOD    Special Requests RIGHT ANTICUBITAL 20ML    Culture NO GROWTH 6 DAYS       Medications:      amLODIPine  5 mg Oral Daily    linezolid  600 mg Oral 2 times per day    cefdinir  300 mg Oral 2 times per day    enoxaparin  40 mg Subcutaneous BID    nicotine  1 patch Transdermal Daily           [facility] [address] [place] [address] [employer] messaging  OFFICE: [phone]      Electronically signed by Mitchell Anderson MD on 2/26/2021 at 10:52 AM  Thank you for allowing us to participate in the care of this patient. Please call with questions. This note iscreated with the assistance of a speech recognition program.  While intending to generate a document that actually reflects the content of the visit, the document can still have some errors including those of syntax andsound a like substitutions which may escape proof reading. In such instances, actual meaning can be extrapolated by contextual diversion.

## 2021-02-26 NOTE — DISCHARGE SUMMARY
[facility].,    Adult Hospitalist      Patient ID: Coco Pinzon  MRN: [id number]     Acct:  [de-identified]       Patient's PCP: No primary care provider on file. Admit Date: 2/18/2021     Discharge Date:    02/26/2021    Admitting Physician: Ancelmo Ferrera MD    Discharge Physician: Carmelita Garcia MD     CONSULTANTS: No care team member to display      Active Discharge Diagnoses:  · Lower abdominal wall cellulitis/panniculitis  · Sepsis  · Concern for MRSA  · Dehydration   · Tobacco use       Hospital Course: Coco Pinzon is a 41 y.o.  female who presents with Abscess (lower abdomen)     Pt presented to the [address] [place] [address] ED with c/o redness that extended from a small area over her left lower abdomen. Pt says she noted there was a 'boil' over there just a day ago. She says she did not even realize and the next day felt the redness spread very rapidly over the whole lower anterior abdomen which concerned her. Pt says she has also felt febrile. She felt nauseous and was diaphoretic. She denies any vomiting, headache, vision change, diarrhea. She says there is tenderness, warmth  And redness over the affected area that worsened rapidly within a day.  Pt is also found to be tachycardiac and felt thirsty and weak., admitted was found to have lower abdominal wall cellulitis /panniculitis, also had some sepsis secondary to that, blood cultures were drawn, patient was started on IV antibiotics, IV  Zyvox some concern for MRSA, patient was seen by Infectious  Disease, patient did underwent status post drainage of then urinated area, further patient started feel better antibiotic therapy was switched to [address] and oral Zyvox, and discharge from the hospital in stable condition, patient was told to have a very close follow-up with Infectious Disease and primary care physician as an outpatient, discharged in stable condition          The plan was discussed in detail with patient who agreed with the plan and verbalized understanding . The patient was seen and examined on day of discharge and this discharge summary is in conjunction with any daily progress note from day of discharge. Hospital Data:    Labs:    Hematology:No results for input(s): WBC, RBC, HGB, HCT, MCV, MCH, MCHC, RDW, PLT, MPV, SEDRATE, CRP, INR, DDIMER, SQ6CXHTQ, LABABSO in the last 72 hours. Invalid input(s): PT  Chemistry:  Recent Labs     02/24/21  0602 02/25/21  0623 02/26/21  0511    137 138   K 3.8 4.7 4.1    101 104   CO2 27 28 27   GLUCOSE 205* 142* 185*   BUN 10 13 13   CREATININE 0.73 0.60 0.68   MG 1.7 1.9 1.7   ANIONGAP 7* 8* 7*   LABGLOM >60 >60 >60   GFRAA >60 >60 >60   CALCIUM 8.3* 8.9 8.3*     No results for input(s): PROT, LABALBU, LABA1C, V5ZPEBC, P8GCDJF, FT4, TSH, AST, ALT, LDH, GGT, ALKPHOS, LABGGT, BILITOT, BILIDIR, AMMONIA, AMYLASE, LIPASE, LACTATE, CHOL, HDL, LDLCHOLESTEROL, CHOLHDLRATIO, TRIG, VLDL, YJV27BL, PHENYTOIN, PHENYF, URICACID, POCGLU in the last 72 hours. No results found for: INR, PROTIME  Lab Results   Component Value Date/Time    SPECIAL NOT REPORTED 02/21/2021 09:20 AM     Lab Results   Component Value Date/Time    CULTURE NO GROWTH 4 DAYS 02/21/2021 09:20 AM       No results found for: POCPH, PHART, PH, POCPCO2, ILT0IXG, PCO2, POCPO2, PO2ART, PO2, POCHCO3, VRF8MPD, HCO3, NBEA, PBEA, BEART, BE, THGBART, THB, QAS2WIT, GSAO1NYM, J6ZKLIXF, O2SAT, FIO2    Radiology:    Ct Abdomen Pelvis W Iv Contrast    Result Date: 2/24/2021  No acute intra-abdominal or intrapelvic abnormalities are noted. Stable mild cutaneous /subcutaneous edema along the lower anterior and lateral abdominal and pelvic wall. No evidence of abscess. Ct Abdomen Pelvis W Iv Contrast Additional Contrast? Oral    Result Date: 2/19/2021  There is mild cutaneous/subcutaneus soft tissue infiltration involving the lower anterior abdominal and pelvic wall. Findings are consistent with reported cellulitis.  However no abscess collection is noted. No other acute abnormality within the abdomen and pelvis.          All radiological studies reviewed      Reviews of Symptoms:    A 10 point system is reviewed and  negative except described in hospital course    Physical Exam:    Vitals:  /69   Pulse 58   Temp 97.9 °F (36.6 °C) (Oral)   Resp 16   Ht 5' 5\" (1.651 m)   Wt (!) 334 lb (151.5 kg)   LMP 2021   SpO2 96%   BMI 55.58 kg/m²   Temp (24hrs), Av.7 °F (36.5 °C), Min:97.3 °F (36.3 °C), Max:98.1 °F (36.7 °C)      General appearance - alert, well appearing, and in no acute distress  Mental status - oriented to person, place, and time with normal affect  Head - normocephalic and atraumatic  Eyes - pupils equal and reactive, extraocular eye movements intact, conjunctiva clear  Ears - hearing appears to be intact  Nose - no drainage noted  Mouth - mucous membranes moist  Neck - supple, no carotid bruits, thyroid not palpable  Chest - clear to auscultation, normal effort  Heart - normal rate, regular rhythm, no murmur  Abdomen - soft, nontender, nondistended, bowel sounds present all four quadrants, no masses, hepatomegaly or splenomegaly  Neurological - normal speech, no focal findings or movement disorder noted, cranial nerves II through XII grossly intact  Extremities - peripheral pulses palpable, no pedal edema or calf pain with palpation  Skin - no gross lesions, rashes, or induration noted      Consults:  IP CONSULT TO SPIRITUAL SERVICES  IP CONSULT TO GENERAL SURGERY  IP CONSULT TO INFECTIOUS DISEASES    Disposition: home care    Discharged Condition: Stable    Follow Up: Ximena Echeverria MD  [facility], [address] [place] [address]  [phone]    Schedule an appointment as soon as possible for a visit in 2 weeks      Tobin Molina, FRAN - CNP  [address]  [place] [address]  [phone]    Go on 3/24/2021  Appointment 3-24-21 at [address] [facility] AT [facility]  [address]  [address] [place] [address]  MD Corby  [address] [place] [address] Deb Talavera  [phone]    Schedule an appointment as soon as possible for a visit in 2 weeks        Lab Frequency Next Occurrence   Activity as tolerated PRN    Apply heat to affected area PRN    BUN & CREATININE EVERY OTHER DAY    Basic Metabolic Panel DAILY    Magnesium Lab DAILY          Diet: DIET GENERAL;    Discharge Medications:    Rubbie Soulier, [address] [facility] Medication Instructions [id number]    Printed on:02/26/21 1345   Medication Information                      amLODIPine (NORVASC) 5 MG tablet  Take 1 tablet by mouth daily             cefdinir (OMNICEF) 300 MG capsule  Take 1 capsule by mouth every 12 hours for 10 days             linezolid (ZYVOX) 600 MG tablet  Take 1 tablet by mouth every 12 hours for 14 days             oxyCODONE-acetaminophen (PERCOCET) 5-325 MG per tablet  Take 1 tablet by mouth every 4 hours as needed for Pain for up to 3 days. Code Status:  Full Code    Time Spent on discharge is  35 mins in patient examination, evaluation, counseling as well as medication reconciliation, prescriptions for required medications, discharge plan and follow up. Electronically signed by Hugo Pelaez MD on 2/26/2021 at 1:47 PM     Thank you Dr. Vega primary care provider on file. for the opportunity to be involved in this patient's care. This note was created with the assistance of a speech-recognition program.  Although the intention is to generate a document that actually reflects the content of the visit, no guarantees can be provided that every mistake has been identified and corrected by editing. Note was updated later by me after  physical examination and  completion of the assessment.

## 2021-02-26 NOTE — PROGRESS NOTES
Physician Progress Note      Noman Chow  CSN #:                  [id number]  :                       1975  ADMIT DATE:       2021 7:45 PM  [address] [facility] DATE:  RESPONDING  PROVIDER #:        An Lewis DO          QUERY TEXT:    Patient admitted with abdominal wall cellulitis. Per procedure note dated   2021 documentation of I&D. To accurately reflect the procedure performed please further specify the depth   of tissue incised and drained: The medical record reflects the following:  Risk Factors: Abdominal wall cellulitis  Clinical Indicators: per procedure note Using an 11 blade incision was made at   the site of induration near the pubis and the area was irrigated and packing   was placed  Treatment: I&D with packing    Thank you,  Alice Lyle RN CDI Supervisor  Options provided:  -- Skin only  -- Subcutaneous tissue  -- Fascia  -- Other - I will add my own diagnosis  -- Disagree - Not applicable / Not valid  -- Disagree - Clinically unable to determine / Unknown  -- Refer to Clinical Documentation Reviewer    PROVIDER RESPONSE TEXT:    Addendum to 2021 procedure note The depth of the drainage to 2021   was down to and including subcutaneous tissue.     Query created by: Maggi Mcbride on 2021 1:11 PM      Electronically signed by:  An Lewis DO 2021 7:13 AM

## 2021-02-26 NOTE — PROGRESS NOTES
Pt discharged to home in stable condition with belongings  Discharge instructions given  Pt denies having any further questions at this time  Locked up home medication(s)/personal items given to patient at discharge  Patient/family state they have everything they were admitted with.

## 2021-03-08 ENCOUNTER — HOSPITAL ENCOUNTER (OUTPATIENT)
Dept: SURGERY | Age: 46
Discharge: HOME | End: 2021-03-08
Payer: MEDICARE

## 2021-03-08 ENCOUNTER — TELEPHONE (OUTPATIENT)
Dept: INFECTIOUS DISEASES | Age: 46
End: 2021-03-08

## 2021-03-08 VITALS
WEIGHT: 293 LBS | HEIGHT: 65 IN | HEART RATE: 71 BPM | SYSTOLIC BLOOD PRESSURE: 151 MMHG | DIASTOLIC BLOOD PRESSURE: 102 MMHG | BODY MASS INDEX: 48.82 KG/M2

## 2021-03-08 PROCEDURE — 99213 OFFICE O/P EST LOW 20 MIN: CPT | Performed by: SURGERY

## 2021-03-08 PROCEDURE — 99211 OFF/OP EST MAY X REQ PHY/QHP: CPT

## 2021-03-08 NOTE — TELEPHONE ENCOUNTER
The patient called and stated that she is on the ABX cefdinir and zyvox and the back of her throat is swollen. She stated that she just notice the swelling today.  Please advise

## 2021-03-08 NOTE — PROGRESS NOTES
[facility]  Progress Note        NAME:  Kathya Sinha  MRN: [id number]   YOB: 1975   Date: 3/8/2021   Age: 41 y.o. Gender: female     Body mass index is 54.25 kg/m². Chief Complaint: Abdominal wall cellulitis    History of Present Illness: This is a very pleasant 55-year-old female who was recently seen in the hospital with abdominal wall cellulitis. The patient had a bedside sharp incision and drainage procedure performed. She had persistent cellulitis and panniculitis. The patient has been on antibiotics since discharge from the hospital.  She is actually finishing one antibiotic today and has another that is finishing at the end of the week. Her abdominal wall firmness, erythema, and tenderness has improved significantly. She has still been using a dry dressing. Really her only complaint at this point is her tape irritation of her skin related to her dressings. She states that the drainage has been fairly minimal.  The patient states that she has not had any additional imaging since her discharge from the hospital.  She is currently on Zyvox per the direction of infectious disease. She denies any fevers or chills. She has no current open wounds. She has not had any generalized abdominal pain. She does still have one area of firmness just cephalad to her previous sharp incision and drainage procedure site. Current Outpatient Medications on File Prior to Encounter   Medication Sig Dispense Refill    linezolid (ZYVOX) 600 MG tablet Take 1 tablet by mouth every 12 hours for 14 days 28 tablet 0    amLODIPine (NORVASC) 5 MG tablet Take 1 tablet by mouth daily 30 tablet 3    cefdinir (OMNICEF) 300 MG capsule Take 1 capsule by mouth every 12 hours for 10 days 20 capsule 0     No current facility-administered medications on file prior to encounter.         No Known Allergies    Past Medical History:   Diagnosis Date    PID (acute pelvic inflammatory disease) 2013 Past Surgical History:   Procedure Laterality Date    CERVICAL FUSION  2010 roughly 2010       Family history: Hypertension    Social History     Socioeconomic History    Marital status: Single     Spouse name: Not on file    Number of children: Not on file    Years of education: Not on file    Highest education level: Not on file   Occupational History    Not on file   Social Needs    Financial resource strain: Not on file    Food insecurity     Worry: Not on file     Inability: Not on file    Transportation needs     Medical: Not on file     Non-medical: Not on file   Tobacco Use    Smoking status: Current Every Day Smoker     Packs/day: 1.00     Types: Cigarettes    Smokeless tobacco: Never Used   Substance and Sexual Activity    Alcohol use: Yes     Frequency: Monthly or less    Drug use: Yes     Frequency: 1.0 times per week     Types: Marijuana     Comment: gets it from dispensary in [facility] Sexual activity: Not on file   Lifestyle    Physical activity     Days per week: Not on file     Minutes per session: Not on file    Stress: Not on file   Relationships    Social connections     Talks on phone: Not on file     Gets together: Not on file     Attends Tenriism service: Not on file     Active member of club or organization: Not on file     Attends meetings of clubs or organizations: Not on file     Relationship status: Not on file    Intimate partner violence     Fear of current or ex partner: Not on file     Emotionally abused: Not on file     Physically abused: Not on file     Forced sexual activity: Not on file   Other Topics Concern    Not on file   Social History Narrative    Not on file       Review of Systems - History obtained from chart review and the patient  General ROS: negative for - chills, fever or malaise  Psychological ROS: negative for - anxiety, depression or hallucinations  Ophthalmic ROS: negative for - blurry vision, dry eyes or eye pain  ENT ROS: negative for - epistaxis, oral lesions or sinus pain  Allergy and Immunology ROS: negative for - hives, itchy/watery eyes or latex  Hematological and Lymphatic ROS: negative for - bleeding problems, blood clots or jaundice  Endocrine ROS: negative for - palpitations, polydipsia/polyuria or unexpected weight changes  Respiratory ROS: no cough, shortness of breath, or wheezing  Cardiovascular ROS: no chest pain or dyspnea on exertion  Gastrointestinal ROS: Mild lower abdominal wall discomfort related to skin irritation, no generalized abdominal pain, no nausea or vomiting  Genito-Urinary ROS: no dysuria, trouble voiding, or hematuria  Musculoskeletal ROS: negative for - joint pain, joint stiffness or joint swelling  Neurological ROS: no TIA or stroke symptoms  Dermatological ROS: negative for - pruritus, rash or skin lesion changes    Vitals:    03/08/21 1345   BP: (!) 151/102   Pulse: 71    Wt:  326 lb  BMI:  54.25  Resp:  14    No intake/output data recorded.     General Appearance: alert and oriented to person, place and time, well-developed and well-nourished, in no acute distress  Skin: warm and dry and no rash or erythema  Head: normocephalic and atraumatic  Eyes: pupils equal, round, and reactive to light, extraocular eye movements intact, conjunctivae normal  ENT: hearing grossly normal bilaterally  Neck: neck supple and non tender without mass, no thyromegaly or thyroid nodules, no cervical lymphadenopathy   Pulmonary/Chest: clear to auscultation bilaterally- no wheezes, rales or rhonchi, normal air movement, no respiratory distress  Cardiovascular: normal rate, normal S1 and S2, no gallops, intact distal pulses and no carotid bruits  Abdomen: Abdomen soft, obese, no peritoneal signs, no guarding, no rebound, no organomegaly, resolving cellulitis of her lower abdominal wall, one small area of central induration with no drainable fluid collections, no active drainage  Genitourinary: genitals normal without hernia or inguinal adenopathy  Neurologic: No asymmetric motor or neuro deficits  Extremities: No asymmetric edema or deformity  Vascular: Palpable radial, femoral, and dorsalis pedis pulses    Hemoglobin   Date Value Ref Range Status   02/23/2021 12.7 11.9 - 15.1 g/dL Final     Hematocrit   Date Value Ref Range Status   02/23/2021 39.9 36.3 - 47.1 % Final     WBC   Date Value Ref Range Status   02/23/2021 7.7 3.5 - 11.3 k/uL Final     Sodium   Date Value Ref Range Status   02/26/2021 138 135 - 144 mmol/L Final     Potassium   Date Value Ref Range Status   02/26/2021 4.1 3.7 - 5.3 mmol/L Final     Chloride   Date Value Ref Range Status   02/26/2021 104 98 - 107 mmol/L Final     CO2   Date Value Ref Range Status   02/26/2021 27 20 - 31 mmol/L Final     BUN   Date Value Ref Range Status   02/26/2021 13 6 - 20 mg/dL Final     Glucose   Date Value Ref Range Status   02/26/2021 185 (H) 70 - 99 mg/dL Final        Narrative   EXAMINATION:   CT OF THE ABDOMEN AND PELVIS WITH CONTRAST 2/24/2021 12:10 pm       TECHNIQUE:   CT of the abdomen and pelvis was performed with the administration of   intravenous contrast. Multiplanar reformatted images are provided for review. Dose modulation, iterative reconstruction, and/or weight based adjustment of   the mA/kV was utilized to reduce the radiation dose to as low as reasonably   achievable.       COMPARISON:   02/19/2021       HISTORY:   ORDERING SYSTEM PROVIDED HISTORY: eval for abd abscess   TECHNOLOGIST PROVIDED HISTORY:   PO and IV contrast   eval for abd abscess   Reason for Exam: cellulitis abdomen with abscess,   Acuity: Acute   Type of Exam: Initial   Relevant Medical/Surgical History: re evaluation, pt had incisional procedure       FINDINGS:   Lower Chest: Lung bases are clear.       Organs: Liver is normal in size and density. No focal masses identified.  No   evidence of intrahepatic ductal dilatation.    Spleen is normal size.  The   gallbladder is unremarkable.  Both adrenal glands are normal.  Pancreas is   normal in appearance. . The kidneys are  normal in size and attenuation   without evidence of hydronephrosis or renal calculi.       GI/Bowel: The visualized bowel and mesentery show no mass lesions. Appendix   is normal       Pelvis: No intrapelvic mass is identified.  Bladder and rectum are intact.       Peritoneum/Retroperitoneum: No free fluid. No lymphadenopathy. No evidence of   pneumoperitoneum.       Bones/Soft Tissues: Stable mild cutaneous /subcutaneous edema along the lower   anterior and lateral abdominal and pelvic wall.  No evidence of abscess. Degenerative changes seen in the lower thoracic and lower lumbar spine.  No   acute bony abnormalities.           Impression   No acute intra-abdominal or intrapelvic abnormalities are noted.       Stable mild cutaneous /subcutaneous edema along the lower anterior and   lateral abdominal and pelvic wall.  No evidence of abscess.             Assessment: Resolving abdominal wall cellulitis likely representing panniculitis with resolving abscess. Plan: At this point, I discussed with the patient my recommendations for finishing her current course of antibiotics. I do not think that any further antibiotics are necessary after she finishes these on Friday. I discussed with the patient performing warm compresses and using a dry dressing only as needed for drainage. Given her residual persistent area of induration, I have recommended follow-up with us in the office in a couple weeks to assure resolution. We discussed the signs of worsening infection when she has completed antibiotics including increasing erythema pain fevers unusual drainage, or swelling. I discussed with the patient calling us in the interval should she have any questions or concerns.   I spent approximately 25 minutes with the patient reviewing her medical records, imaging, performing a history and physical examination, and developing an assessment and

## 2021-03-16 ENCOUNTER — TELEPHONE (OUTPATIENT)
Dept: FAMILY MEDICINE CLINIC | Age: 46
End: 2021-03-16

## 2021-03-16 NOTE — TELEPHONE ENCOUNTER
Pt was put on Norvasc while in the hospital.  She says she is super tired and has no energy. She wants to know if that is a side effect from the norvasc?  Has new pt appt with you on 3/24/21

## 2021-03-22 ENCOUNTER — OFFICE VISIT (OUTPATIENT)
Dept: INFECTIOUS DISEASES | Age: 46
End: 2021-03-22
Payer: MEDICARE

## 2021-03-22 VITALS
HEIGHT: 65 IN | WEIGHT: 293 LBS | DIASTOLIC BLOOD PRESSURE: 106 MMHG | SYSTOLIC BLOOD PRESSURE: 145 MMHG | TEMPERATURE: 97.2 F | BODY MASS INDEX: 48.82 KG/M2

## 2021-03-22 DIAGNOSIS — L03.319 CELLULITIS AND ABSCESS OF TRUNK: Primary | ICD-10-CM

## 2021-03-22 DIAGNOSIS — L02.219 CELLULITIS AND ABSCESS OF TRUNK: Primary | ICD-10-CM

## 2021-03-22 DIAGNOSIS — Z98.890 STATUS POST INCISION AND DRAINAGE: ICD-10-CM

## 2021-03-22 PROCEDURE — 99212 OFFICE O/P EST SF 10 MIN: CPT | Performed by: INTERNAL MEDICINE

## 2021-03-22 PROCEDURE — G8417 CALC BMI ABV UP PARAM F/U: HCPCS | Performed by: INTERNAL MEDICINE

## 2021-03-22 PROCEDURE — 4004F PT TOBACCO SCREEN RCVD TLK: CPT | Performed by: INTERNAL MEDICINE

## 2021-03-22 PROCEDURE — G8484 FLU IMMUNIZE NO ADMIN: HCPCS | Performed by: INTERNAL MEDICINE

## 2021-03-22 PROCEDURE — G8427 DOCREV CUR MEDS BY ELIG CLIN: HCPCS | Performed by: INTERNAL MEDICINE

## 2021-03-22 PROCEDURE — 1111F DSCHRG MED/CURRENT MED MERGE: CPT | Performed by: INTERNAL MEDICINE

## 2021-03-22 ASSESSMENT — ENCOUNTER SYMPTOMS
GASTROINTESTINAL NEGATIVE: 1
RESPIRATORY NEGATIVE: 1

## 2021-03-22 NOTE — PROGRESS NOTES
[facility]  Office Progress Note  Today's Date and Time: 3/22/2021, 4:17 PM    Impression:     1. Cellulitis and abscess of trunk    2. Status post incision and drainage         Recommendations   · The cellulitic changes on the abdominal wall have completely resolved. · The abscess is also resolved and there is a small area superior to the incision that has some firmness but otherwise this is resolved. · At this point in time I do not feel that there is any further therapy warranted. · The patient can follow-up with me on an as-needed basis. I have ordered the following medications/ labs:  No orders of the defined types were placed in this encounter. No orders of the defined types were placed in this encounter. Chief complaint/reason for consultation:     Chief Complaint   Patient presents with    Follow-Up from [facility]        History of Present Illness:   Hildred Brittle is a 41y.o.-year-old female who is seen in follow-up after recent hospital stay with abdominal wall cellulitis/abscess in the mons pubis area. The patient did under go a incision and drainage by the surgical team and she did receive intravenous antimicrobial therapy with Zosyn and vancomycin while hospitalized and was subsequently switched to KINNEY DAVID and Zyvox at the time of discharge. The patient did call the office March 8, 2021 and was reporting significant pruritus in the surgical area/area of abscess and she thought this may have been related to the antimicrobial therapy and I recommended stopping the antibiotics at that time. The patient has been off antibiotics and she reports that soon after her pruritus did seem to resolve. She is otherwise doing well does not report any subjective fevers or chills, no abdominal pain nausea vomiting, she has not had any diarrhea with antimicrobial therapy.     I have personally reviewedthe past medical history, medications, social history, and I have updated the database accordingly. Past Medical History:     Past Medical History:   Diagnosis Date    PID (acute pelvic inflammatory disease) 2013     Medications:     Current Outpatient Medications   Medication Sig Dispense Refill    amLODIPine (NORVASC) 5 MG tablet Take 1 tablet by mouth daily 30 tablet 3     No current facility-administered medications for this visit. Allergies:   Patient has no known allergies. Review of Systems:   Review of Systems   Constitutional: Negative. Respiratory: Negative. Cardiovascular: Negative. Gastrointestinal: Negative. Genitourinary: Negative. Musculoskeletal: Negative. Skin: Negative. Neurological: Negative. Psychiatric/Behavioral: Negative. Physical Examination :   BP (!) 145/106 (Site: Right Lower Arm, Position: Sitting, Cuff Size: Medium Adult)   Temp 97.2 °F (36.2 °C) (Temporal)   Ht 5' 5\" (1.651 m)   Wt (!) 329 lb (149.2 kg)   BMI 54.75 kg/m²     Physical Exam  Constitutional:       Appearance: She is well-developed. She is obese. HENT:      Head: Normocephalic and atraumatic. Neck:      Musculoskeletal: Normal range of motion and neck supple. Cardiovascular:      Rate and Rhythm: Regular rhythm. Heart sounds: Normal heart sounds. Pulmonary:      Effort: Pulmonary effort is normal.      Breath sounds: Normal breath sounds. Abdominal:      General: Bowel sounds are normal.      Palpations: Abdomen is soft. Skin:     General: Skin is warm and dry. Neurological:      Mental Status: She is alert and oriented to person, place, and time.          Laboratory studies :  Medical Decision Making:   I have independently reviewed the following labs:  CBC with Differential:  Lab Results   Component Value Date    WBC 7.7 02/23/2021    WBC 13.7 02/19/2021    HGB 12.7 02/23/2021    HGB 12.9 02/19/2021    HCT 39.9 02/23/2021    HCT 40.5 02/19/2021     02/23/2021     02/19/2021    LYMPHOPCT 21 02/23/2021 LYMPHOPCT 16 02/19/2021    MONOPCT 6 02/23/2021    MONOPCT 6 02/19/2021       BMP:  Lab Results   Component Value Date     02/26/2021     02/25/2021    K 4.1 02/26/2021    K 4.7 02/25/2021     02/26/2021     02/25/2021    CO2 27 02/26/2021    CO2 28 02/25/2021    BUN 13 02/26/2021    BUN 13 02/25/2021    CREATININE 0.68 02/26/2021    CREATININE 0.60 02/25/2021    MG 1.7 02/26/2021    MG 1.9 02/25/2021       Hepatic Function Panel: No results found for: PROT, LABALBU, BILIDIR, IBILI, BILITOT, ALKPHOS, ALT, AST    No results found for: CRP  No results found for: SEDRATE      Thank you for allowing us to participate in the care of this patient. Pleasecall with questions. Alissa Bernabe MD  [employer] messaging: [phone]    This note is created with the assistance of a speech recognition program.  While intending to generate a document that actually reflects the content ofthe visit, the document can still have some errors including those of syntax and sound a like substitutions which may escape proof reading. It such instances, actual meaning can be extrapolated by contextual diversion.

## 2021-09-08 NOTE — PROGRESS NOTES
Day of Therapy:4  Current Dose:vancomycin 1500mg ivpb q8h . Vanco trough tomorrow. Culture Results           Blood:no growth x 4 days            Sputum:           Wound:abscess pending            Urine: Other:  Temp Readings from Last 3 Encounters:   02/22/21 97.9 °F (36.6 °C) (Oral)   09/03/20 98 °F (36.7 °C) (Oral)     No results for input(s): WBC in the last 72 hours. No results for input(s): CREATININE in the last 72 hours. Estimated Creatinine Clearance: 149 mL/min (based on SCr of 0.72 mg/dL).     Intake/Output Summary (Last 24 hours) at 2/22/2021 1004  Last data filed at 2/21/2021 1820  Gross per 24 hour   Intake 1740.63 ml   Output --   Net 1740.63 ml Stelara Counseling:  I discussed with the patient the risks of ustekinumab including but not limited to immunosuppression, malignancy, posterior leukoencephalopathy syndrome, and serious infections.  The patient understands that monitoring is required including a PPD at baseline and must alert us or the primary physician if symptoms of infection or other concerning signs are noted.

## 2022-06-06 ENCOUNTER — APPOINTMENT (OUTPATIENT)
Dept: CT IMAGING | Facility: CLINIC | Age: 47
End: 2022-06-06
Payer: MEDICARE

## 2022-06-06 ENCOUNTER — HOSPITAL ENCOUNTER (EMERGENCY)
Age: 47
Discharge: HOME | End: 2022-06-07
Attending: EMERGENCY MEDICINE
Payer: MEDICARE

## 2022-06-06 ENCOUNTER — APPOINTMENT (OUTPATIENT)
Dept: GENERAL RADIOLOGY | Age: 47
End: 2022-06-06
Payer: MEDICARE

## 2022-06-06 ENCOUNTER — HOSPITAL ENCOUNTER (EMERGENCY)
Facility: CLINIC | Age: 47
Discharge: ANOTHER HOSPITAL | End: 2022-06-06
Attending: EMERGENCY MEDICINE
Payer: MEDICARE

## 2022-06-06 ENCOUNTER — APPOINTMENT (OUTPATIENT)
Dept: CT IMAGING | Age: 47
End: 2022-06-06
Payer: MEDICARE

## 2022-06-06 VITALS
TEMPERATURE: 98.4 F | HEART RATE: 84 BPM | DIASTOLIC BLOOD PRESSURE: 78 MMHG | SYSTOLIC BLOOD PRESSURE: 130 MMHG | BODY MASS INDEX: 52.42 KG/M2 | WEIGHT: 293 LBS | RESPIRATION RATE: 18 BRPM | OXYGEN SATURATION: 95 %

## 2022-06-06 DIAGNOSIS — S02.2XXA CLOSED FRACTURE OF NASAL BONE, INITIAL ENCOUNTER: Primary | ICD-10-CM

## 2022-06-06 DIAGNOSIS — S02.2XXA CLOSED FRACTURE OF NASAL BONE, INITIAL ENCOUNTER: ICD-10-CM

## 2022-06-06 DIAGNOSIS — S12.301A CLOSED NONDISPLACED FRACTURE OF FOURTH CERVICAL VERTEBRA, UNSPECIFIED FRACTURE MORPHOLOGY, INITIAL ENCOUNTER (HCC): Primary | ICD-10-CM
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
## 2022-06-06 LAB
ABSOLUTE EOS #: 0.07 K/UL (ref 0–0.44)
ABSOLUTE IMMATURE GRANULOCYTE: <0.03 K/UL (ref 0–0.3)
ABSOLUTE LYMPH #: 3.19 K/UL (ref 1.1–3.7)
ABSOLUTE MONO #: 0.55 K/UL (ref 0.1–1.2)
ANION GAP SERPL CALCULATED.3IONS-SCNC: 12 MMOL/L (ref 9–17)
BASOPHILS # BLD: 0 % (ref 0–2)
BASOPHILS ABSOLUTE: 0.03 K/UL (ref 0–0.2)
BUN BLDV-MCNC: 13 MG/DL (ref 6–20)
CALCIUM SERPL-MCNC: 8.4 MG/DL (ref 8.6–10.4)
CHLORIDE BLD-SCNC: 102 MMOL/L (ref 98–107)
CO2: 23 MMOL/L (ref 20–31)
CREAT SERPL-MCNC: 0.59 MG/DL (ref 0.5–0.9)
EOSINOPHILS RELATIVE PERCENT: 1 % (ref 1–4)
GFR AFRICAN AMERICAN: >60 ML/MIN
GFR NON-AFRICAN AMERICAN: >60 ML/MIN
GFR SERPL CREATININE-BSD FRML MDRD: ABNORMAL ML/MIN/{1.73_M2}
GLUCOSE BLD-MCNC: 110 MG/DL (ref 70–99)
HCT VFR BLD CALC: 42 % (ref 36.3–47.1)
HEMOGLOBIN: 14 G/DL (ref 11.9–15.1)
IMMATURE GRANULOCYTES: 0 %
LYMPHOCYTES # BLD: 35 % (ref 24–43)
MCH RBC QN AUTO: 30.6 PG (ref 25.2–33.5)
MCHC RBC AUTO-ENTMCNC: 33.3 G/DL (ref 28.4–34.8)
MCV RBC AUTO: 91.7 FL (ref 82.6–102.9)
MONOCYTES # BLD: 6 % (ref 3–12)
NRBC AUTOMATED: 0 PER 100 WBC
PDW BLD-RTO: 12.8 % (ref 11.8–14.4)
PLATELET # BLD: 226 K/UL (ref 138–453)
PMV BLD AUTO: 10.8 FL (ref 8.1–13.5)
POTASSIUM SERPL-SCNC: 3.9 MMOL/L (ref 3.7–5.3)
RBC # BLD: 4.58 M/UL (ref 3.95–5.11)
SEG NEUTROPHILS: 58 % (ref 36–65)
SEGMENTED NEUTROPHILS ABSOLUTE COUNT: 5.39 K/UL (ref 1.5–8.1)
SODIUM BLD-SCNC: 137 MMOL/L (ref 135–144)
WBC # BLD: 9.2 K/UL (ref 3.5–11.3)

## 2022-06-06 PROCEDURE — 85025 COMPLETE CBC W/AUTO DIFF WBC: CPT

## 2022-06-06 PROCEDURE — 99285 EMERGENCY DEPT VISIT HI MDM: CPT

## 2022-06-06 PROCEDURE — 6370000000 HC RX 637 (ALT 250 FOR IP): Performed by: STUDENT IN AN ORGANIZED HEALTH CARE EDUCATION/TRAINING PROGRAM

## 2022-06-06 PROCEDURE — 73030 X-RAY EXAM OF SHOULDER: CPT

## 2022-06-06 PROCEDURE — 72125 CT NECK SPINE W/O DYE: CPT

## 2022-06-06 PROCEDURE — 6370000000 HC RX 637 (ALT 250 FOR IP): Performed by: REGISTERED NURSE

## 2022-06-06 PROCEDURE — 70486 CT MAXILLOFACIAL W/O DYE: CPT

## 2022-06-06 PROCEDURE — 73090 X-RAY EXAM OF FOREARM: CPT

## 2022-06-06 PROCEDURE — 80048 BASIC METABOLIC PNL TOTAL CA: CPT

## 2022-06-06 PROCEDURE — 84703 CHORIONIC GONADOTROPIN ASSAY: CPT

## 2022-06-06 RX ORDER — LOSARTAN POTASSIUM 25 MG/1
25 TABLET ORAL DAILY
COMMUNITY

## 2022-06-06 RX ORDER — ONDANSETRON 4 MG/1
4 TABLET, ORALLY DISINTEGRATING ORAL ONCE
Status: COMPLETED | OUTPATIENT
Start: 2022-06-06 | End: 2022-06-06

## 2022-06-06 RX ORDER — HYDROCODONE BITARTRATE AND ACETAMINOPHEN 5; 325 MG/1; MG/1
1 TABLET ORAL ONCE
Status: COMPLETED | OUTPATIENT
Start: 2022-06-06 | End: 2022-06-06

## 2022-06-06 RX ADMIN — HYDROCODONE BITARTRATE AND ACETAMINOPHEN 1 TABLET: 5; 325 TABLET ORAL at 20:15

## 2022-06-06 RX ADMIN — ONDANSETRON 4 MG: 4 TABLET, ORALLY DISINTEGRATING ORAL at 20:15

## 2022-06-06 RX ADMIN — HYDROCODONE BITARTRATE AND ACETAMINOPHEN 1 TABLET: 5; 325 TABLET ORAL at 23:07

## 2022-06-06 ASSESSMENT — ENCOUNTER SYMPTOMS
FACIAL SWELLING: 1
BACK PAIN: 0
ABDOMINAL PAIN: 0
SHORTNESS OF BREATH: 0
COLOR CHANGE: 1
VOMITING: 0
SINUS PAIN: 0
NAUSEA: 0
COLOR CHANGE: 1
RHINORRHEA: 0
SHORTNESS OF BREATH: 0
VOMITING: 0
SORE THROAT: 0
PHOTOPHOBIA: 0
DIARRHEA: 0
BACK PAIN: 1
ABDOMINAL PAIN: 0
DIARRHEA: 0
NAUSEA: 0
STRIDOR: 0
WHEEZING: 0
COUGH: 0

## 2022-06-06 ASSESSMENT — PAIN - FUNCTIONAL ASSESSMENT: PAIN_FUNCTIONAL_ASSESSMENT: 0-10

## 2022-06-06 ASSESSMENT — PAIN SCALES - GENERAL
PAINLEVEL_OUTOF10: 8
PAINLEVEL_OUTOF10: 8
PAINLEVEL_OUTOF10: 9

## 2022-06-06 ASSESSMENT — PAIN DESCRIPTION - DESCRIPTORS: DESCRIPTORS: ACHING

## 2022-06-06 ASSESSMENT — PAIN DESCRIPTION - LOCATION: LOCATION: FACE;HEAD
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
## 2022-06-07 ENCOUNTER — APPOINTMENT (OUTPATIENT)
Dept: CT IMAGING | Age: 47
End: 2022-06-07
Payer: MEDICARE

## 2022-06-07 VITALS
OXYGEN SATURATION: 95 % | DIASTOLIC BLOOD PRESSURE: 96 MMHG | TEMPERATURE: 97.7 F | HEART RATE: 76 BPM | RESPIRATION RATE: 19 BRPM | SYSTOLIC BLOOD PRESSURE: 147 MMHG

## 2022-06-07 LAB — HCG QUALITATIVE: NEGATIVE

## 2022-06-07 PROCEDURE — 70450 CT HEAD/BRAIN W/O DYE: CPT

## 2022-06-07 PROCEDURE — 6370000000 HC RX 637 (ALT 250 FOR IP): Performed by: STUDENT IN AN ORGANIZED HEALTH CARE EDUCATION/TRAINING PROGRAM

## 2022-06-07 PROCEDURE — 71260 CT THORAX DX C+: CPT

## 2022-06-07 PROCEDURE — 3209999900 CT LUMBAR SPINE TRAUMA RECONSTRUCTION

## 2022-06-07 PROCEDURE — 6360000004 HC RX CONTRAST MEDICATION: Performed by: STUDENT IN AN ORGANIZED HEALTH CARE EDUCATION/TRAINING PROGRAM

## 2022-06-07 PROCEDURE — 3209999900 CT THORACIC SPINE TRAUMA RECONSTRUCTION

## 2022-06-07 RX ORDER — IBUPROFEN 400 MG/1
400 TABLET ORAL EVERY 8 HOURS PRN
Qty: 9 TABLET | Refills: 0 | Status: SHIPPED | OUTPATIENT
Start: 2022-06-07 | End: 2022-06-10

## 2022-06-07 RX ORDER — HYDROCODONE BITARTRATE AND ACETAMINOPHEN 5; 325 MG/1; MG/1
1 TABLET ORAL ONCE
Status: COMPLETED | OUTPATIENT
Start: 2022-06-07 | End: 2022-06-07

## 2022-06-07 RX ORDER — ACETAMINOPHEN 500 MG
1000 TABLET ORAL EVERY 6 HOURS PRN
Qty: 24 TABLET | Refills: 0 | Status: SHIPPED | OUTPATIENT
Start: 2022-06-07 | End: 2022-06-10

## 2022-06-07 RX ADMIN — HYDROCODONE BITARTRATE AND ACETAMINOPHEN 1 TABLET: 5; 325 TABLET ORAL at 02:52

## 2022-06-07 RX ADMIN — IOPAMIDOL 75 ML: 755 INJECTION, SOLUTION INTRAVENOUS at 00:46

## 2022-06-07 ASSESSMENT — ENCOUNTER SYMPTOMS
CHEST TIGHTNESS: 0
PHOTOPHOBIA: 0
ABDOMINAL DISTENTION: 0
VOMITING: 0
SHORTNESS OF BREATH: 0
DIARRHEA: 0
CONSTIPATION: 0
RHINORRHEA: 0
ANAL BLEEDING: 0
NAUSEA: 0
SORE THROAT: 0

## 2022-06-07 ASSESSMENT — PAIN SCALES - GENERAL: PAINLEVEL_OUTOF10: 8

## 2022-06-07 NOTE — ED NOTES
Report called to [facility] [address] ER to [facility], all questions answered.       Kristal Arellano RN  06/06/22 2038
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
## 2022-06-07 NOTE — ED NOTES
The following labs labeled with pt sticker and tubed to lab:     [] Blue     [x] Lavender   [] on ice  [x] Green/yellow  [] Green/black [] on ice  [] Yellow  [] Red  [] Pink      [] COVID-19 swab    [] Rapid  [] PCR  [] Flu swab  [] Peds Viral Panel     [] Urine Sample  [] Pelvic Cultures  [] Blood Cultures            Rocio Bauman RN  06/06/22 0055

## 2022-06-07 NOTE — ED NOTES
Pt accepted by Dr. Silvia Schulz at [facility] BETH Laurent, SHIRLENE  06/06/22 4311
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
## 2024-10-21 ENCOUNTER — HOSPITAL ENCOUNTER (EMERGENCY)
Facility: CLINIC | Age: 49
Discharge: HOME | End: 2024-10-21
Attending: EMERGENCY MEDICINE
Payer: MEDICAID

## 2024-10-21 VITALS
BODY MASS INDEX: 48.32 KG/M2 | TEMPERATURE: 98.1 F | DIASTOLIC BLOOD PRESSURE: 112 MMHG | HEIGHT: 65 IN | SYSTOLIC BLOOD PRESSURE: 153 MMHG | RESPIRATION RATE: 15 BRPM | WEIGHT: 290 LBS | OXYGEN SATURATION: 98 % | HEART RATE: 74 BPM

## 2024-10-21 DIAGNOSIS — T14.8XXA GLASS FOREIGN BODY IN SKIN: Primary | ICD-10-CM

## 2024-10-21 DIAGNOSIS — Z18.81 GLASS FOREIGN BODY IN SKIN: Primary | ICD-10-CM

## 2024-10-21 PROCEDURE — 99283 EMERGENCY DEPT VISIT LOW MDM: CPT

## 2024-10-21 RX ORDER — LIDOCAINE 5% 5 G/100G
1 CREAM TOPICAL 2 TIMES DAILY PRN
Qty: 30 G | Refills: 0 | Status: SHIPPED | OUTPATIENT
Start: 2024-10-21

## 2024-10-21 ASSESSMENT — PAIN - FUNCTIONAL ASSESSMENT
PAIN_FUNCTIONAL_ASSESSMENT: ACTIVITIES ARE NOT PREVENTED
PAIN_FUNCTIONAL_ASSESSMENT: 0-10

## 2024-10-21 ASSESSMENT — PAIN DESCRIPTION - DESCRIPTORS: DESCRIPTORS: ITCHING

## 2024-10-21 ASSESSMENT — PAIN DESCRIPTION - PAIN TYPE: TYPE: ACUTE PAIN

## 2024-10-21 ASSESSMENT — PAIN DESCRIPTION - LOCATION: LOCATION: ARM;LEG

## 2024-10-21 ASSESSMENT — PAIN DESCRIPTION - ORIENTATION: ORIENTATION: LEFT;RIGHT

## 2024-10-21 ASSESSMENT — PAIN DESCRIPTION - FREQUENCY: FREQUENCY: CONTINUOUS

## 2024-10-21 ASSESSMENT — PAIN SCALES - GENERAL: PAINLEVEL_OUTOF10: 3
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
## 2024-10-22 NOTE — ED NOTES
Chlorhexadine solution sent home with pt per Dr. Uriarte order to shower with. Complete instructions given to pt with pt verbalizing understanding. Abrasion left lower leg cleansed with saline and bandaid applied. Pt tolerated well

## 2024-10-22 NOTE — ED PROVIDER NOTES
[facility]  EMERGENCY DEPARTMENT ENCOUNTER      Pt Name: Lilia Anguiano  MRN: [id number]  Birthdate 1975  Date of evaluation: 10/21/2024  Provider: Alexandria Uriarte MD    CHIEF COMPLAINT       Chief Complaint   Patient presents with    Foreign Body in Skin     Glass arms and legs     HISTORY OF PRESENT ILLNESS  (Location/Symptom, Timing/Onset, Context/Setting, Quality, Duration, Modifying Factors, Severity.)   Lilia Anguiano is a 49 y.o. female who presents to the emergency department ***       Nursing Notes were reviewed.    REVIEW OF SYSTEMS    (2-9 systems for level 4, 10 or more for level 5)     ***   Except as noted above the remainder of the review of systems was reviewed and negative.       PAST MEDICAL HISTORY     Past Medical History:   Diagnosis Date    Hypertension     PID (acute pelvic inflammatory disease) 2013       SURGICAL HISTORY       Past Surgical History:   Procedure Laterality Date    CERVICAL FUSION  2010 roughly 2010       CURRENT MEDICATIONS       Discharge Medication List as of 10/21/2024 10:22 PM        CONTINUE these medications which have NOT CHANGED    Details   metFORMIN (GLUCOPHAGE) 500 MG tablet Take 1 tablet by mouth 2 times daily (with meals)Historical Med      dulaglutide (TRULICITY) 1.5 MG/0.5ML SC injection Inject 0.5 mLs into the skin once a weekHistorical Med      acetaminophen (TYLENOL) 500 MG tablet Take 2 tablets by mouth every 6 hours as needed for Pain, Disp-24 tablet, R-0Print      ibuprofen (IBU) 400 MG tablet Take 1 tablet by mouth every 8 hours as needed for Pain, Disp-9 tablet, R-0Print      losartan (COZAAR) 25 MG tablet Take 1 tablet by mouth dailyHistorical Med      amLODIPine (NORVASC) 5 MG tablet Take 1 tablet by mouth daily, Disp-30 tablet, R-3Normal             ALLERGIES     Patient has no known allergies.    FAMILY HISTORY     History reviewed. No pertinent family history.  No family status information on file.        SOCIAL HISTORY